# Patient Record
Sex: FEMALE | Race: WHITE | HISPANIC OR LATINO | Employment: FULL TIME | URBAN - METROPOLITAN AREA
[De-identification: names, ages, dates, MRNs, and addresses within clinical notes are randomized per-mention and may not be internally consistent; named-entity substitution may affect disease eponyms.]

---

## 2019-06-11 ENCOUNTER — OFFICE VISIT (OUTPATIENT)
Dept: URGENT CARE | Facility: CLINIC | Age: 49
End: 2019-06-11
Payer: COMMERCIAL

## 2019-06-11 VITALS
TEMPERATURE: 99 F | DIASTOLIC BLOOD PRESSURE: 63 MMHG | HEIGHT: 65 IN | WEIGHT: 130 LBS | OXYGEN SATURATION: 99 % | HEART RATE: 88 BPM | BODY MASS INDEX: 21.66 KG/M2 | RESPIRATION RATE: 18 BRPM | SYSTOLIC BLOOD PRESSURE: 97 MMHG

## 2019-06-11 DIAGNOSIS — H10.023 OTHER MUCOPURULENT CONJUNCTIVITIS OF BOTH EYES: Primary | ICD-10-CM

## 2019-06-11 PROCEDURE — 99214 OFFICE O/P EST MOD 30 MIN: CPT | Mod: S$GLB,,, | Performed by: FAMILY MEDICINE

## 2019-06-11 PROCEDURE — 3008F PR BODY MASS INDEX (BMI) DOCUMENTED: ICD-10-PCS | Mod: CPTII,S$GLB,, | Performed by: FAMILY MEDICINE

## 2019-06-11 PROCEDURE — 99214 PR OFFICE/OUTPT VISIT, EST, LEVL IV, 30-39 MIN: ICD-10-PCS | Mod: S$GLB,,, | Performed by: FAMILY MEDICINE

## 2019-06-11 PROCEDURE — 3008F BODY MASS INDEX DOCD: CPT | Mod: CPTII,S$GLB,, | Performed by: FAMILY MEDICINE

## 2019-06-11 RX ORDER — GENTAMICIN SULFATE 3 MG/ML
1 SOLUTION/ DROPS OPHTHALMIC EVERY 4 HOURS
Qty: 5 ML | Refills: 0 | Status: SHIPPED | OUTPATIENT
Start: 2019-06-11 | End: 2020-02-24

## 2019-06-11 NOTE — PROGRESS NOTES
"Subjective:       Patient ID: Carie Howell is a 48 y.o. female.    Vitals:  height is 5' 5" (1.651 m) and weight is 59 kg (130 lb). Her tympanic temperature is 98.6 °F (37 °C). Her blood pressure is 97/63 and her pulse is 88. Her respiration is 18 and oxygen saturation is 99%.     Chief Complaint: Eye Problem    This is a 48 y.o. female who presents today with a chief complaint of bilateral eye itchiness with discharge since yesterday. Worsened today. Uses eye drops for dry eyes daily.    Eye Problem    Both eyes are affected.This is a new problem. The current episode started yesterday. The problem occurs constantly. The problem has been gradually worsening. There was no injury mechanism. The pain is at a severity of 0/10. The patient is experiencing no pain. There is no known exposure to pink eye. She wears contacts. Associated symptoms include an eye discharge and itching. Pertinent negatives include no blurred vision, double vision, eye redness, fever, nausea, photophobia or vomiting. She has tried eye drops for the symptoms. The treatment provided no relief.       Constitution: Negative for chills and fever.   HENT: Positive for postnasal drip and sinus pressure. Negative for congestion and sinus pain.    Cardiovascular: Negative for chest pain.   Eyes: Positive for eye discharge and eye itching. Negative for eye trauma, foreign body in eye, eye pain, eye redness, photophobia, vision loss, double vision, blurred vision and eyelid swelling.   Gastrointestinal: Negative for nausea and vomiting.   Genitourinary: Negative for history of kidney stones.   Skin: Negative for rash.   Allergic/Immunologic: Negative for seasonal allergies and itching.   Neurological: Negative for headaches and altered mental status.   Psychiatric/Behavioral: Negative for altered mental status.       Objective:      Physical Exam   Constitutional: She appears well-developed and well-nourished.   HENT:   Head: Normocephalic and " atraumatic.   Eyes: Pupils are equal, round, and reactive to light. EOM are normal. Right eye exhibits discharge. Left eye exhibits discharge. Right conjunctiva is injected. Left conjunctiva is injected.   Neck: Normal range of motion. Neck supple.   Cardiovascular: Normal rate, regular rhythm and normal heart sounds.   Pulmonary/Chest: Effort normal and breath sounds normal.   Nursing note and vitals reviewed.      Assessment:       1. Other mucopurulent conjunctivitis of both eyes        Plan:         Other mucopurulent conjunctivitis of both eyes  -     gentamicin (GARAMYCIN) 0.3 % ophthalmic solution; Place 1 drop into both eyes every 4 (four) hours.  Dispense: 5 mL; Refill: 0    warm compresses. Discussed infection control and if no improvement to see opthalmology.

## 2019-06-11 NOTE — PATIENT INSTRUCTIONS
What Is Conjunctivitis?    Conjunctivitis is an irritation or infection. It affects the membrane that covers the white of your eye and the inside of your eyelid (conjunctiva). It can happen to one or both eyes. The membrane swells and the blood vessels enlarge (dilate). This makes your eye red. That's why conjunctivitis is sometimes called red eye or pink eye.  What are the symptoms?  If you have one or more of these symptoms, see an eye doctor:  · Redness in and around your eye  · Eyes that are puffy and sore  · Itching, burning, or stinging eyes  · Watery eyes or discharge from your eye  · Eyelids that are crusty or stuck together when you wake up in the morning  · Pink color in the whites of one or both eyes  Getting treatment quickly can help prevent damage to your eyes.  How is it diagnosed?  Conjunctivitis is usually a minor eye infection. But it can sometimes become a more serious problem. Some more serious eye diseases have symptoms that look like conjunctivitis. So it's important for an eye doctor to diagnose you. Your eye doctor will ask about your symptoms and any medicines you take. He or she will ask about any illnesses or medical conditions you may have. The doctor will also check your eyes with a hand-held light and a special microscope called a slit lamp.  Date Last Reviewed: 6/11/2015  © 4517-0394 [employer] [facility]. [address] [place] [address], [place] [address]. All rights reserved. This information is not intended as a substitute for professional medical care. Always follow your healthcare professional's instructions.

## 2019-06-15 ENCOUNTER — TELEPHONE (OUTPATIENT)
Dept: URGENT CARE | Facility: CLINIC | Age: 49
End: 2019-06-15

## 2019-06-15 NOTE — TELEPHONE ENCOUNTER
Courtesy call from visit 06/11/2019. Pt states she is feeling much better and she is able to put her contacts in.

## 2019-10-14 LAB — HPV MRNA E6/E7: NOT DETECTED

## 2019-12-31 ENCOUNTER — OFFICE VISIT (OUTPATIENT)
Dept: URGENT CARE | Facility: CLINIC | Age: 49
End: 2019-12-31
Payer: COMMERCIAL

## 2019-12-31 VITALS
SYSTOLIC BLOOD PRESSURE: 92 MMHG | OXYGEN SATURATION: 98 % | WEIGHT: 130 LBS | TEMPERATURE: 99 F | DIASTOLIC BLOOD PRESSURE: 62 MMHG | RESPIRATION RATE: 16 BRPM | HEIGHT: 65 IN | BODY MASS INDEX: 21.66 KG/M2 | HEART RATE: 74 BPM

## 2019-12-31 DIAGNOSIS — M54.2 MUSCULOSKELETAL NECK PAIN: ICD-10-CM

## 2019-12-31 DIAGNOSIS — M54.9 MUSCULOSKELETAL BACK PAIN: ICD-10-CM
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
## 2019-12-31 PROCEDURE — 99214 OFFICE O/P EST MOD 30 MIN: CPT | Mod: 25,S$GLB,, | Performed by: NURSE PRACTITIONER

## 2019-12-31 PROCEDURE — 96372 PR INJECTION,THERAP/PROPH/DIAG2ST, IM OR SUBCUT: ICD-10-PCS | Mod: S$GLB,,, | Performed by: NURSE PRACTITIONER

## 2019-12-31 PROCEDURE — 99214 PR OFFICE/OUTPT VISIT, EST, LEVL IV, 30-39 MIN: ICD-10-PCS | Mod: 25,S$GLB,, | Performed by: NURSE PRACTITIONER

## 2019-12-31 PROCEDURE — 96372 THER/PROPH/DIAG INJ SC/IM: CPT | Mod: S$GLB,,, | Performed by: NURSE PRACTITIONER

## 2019-12-31 RX ORDER — IBUPROFEN 600 MG/1
600 TABLET ORAL EVERY 8 HOURS PRN
Qty: 30 TABLET | Refills: 0 | Status: SHIPPED | OUTPATIENT
Start: 2019-12-31 | End: 2020-12-30

## 2019-12-31 RX ORDER — KETOROLAC TROMETHAMINE 30 MG/ML
30 INJECTION, SOLUTION INTRAMUSCULAR; INTRAVENOUS
Status: COMPLETED | OUTPATIENT
Start: 2019-12-31 | End: 2019-12-31

## 2019-12-31 RX ORDER — CYCLOBENZAPRINE HCL 10 MG
10 TABLET ORAL 3 TIMES DAILY PRN
Qty: 30 TABLET | Refills: 0 | Status: SHIPPED | OUTPATIENT
Start: 2019-12-31 | End: 2020-01-10

## 2019-12-31 RX ADMIN — KETOROLAC TROMETHAMINE 30 MG: 30 INJECTION, SOLUTION INTRAMUSCULAR; INTRAVENOUS at 09:12
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
## 2020-01-21 ENCOUNTER — DOCUMENTATION ONLY (OUTPATIENT)
Dept: ADMINISTRATIVE | Facility: HOSPITAL | Age: 50
End: 2020-01-21

## 2020-01-21 ENCOUNTER — PATIENT OUTREACH (OUTPATIENT)
Dept: ADMINISTRATIVE | Facility: HOSPITAL | Age: 50
End: 2020-01-21

## 2020-01-21 NOTE — PROGRESS NOTES
Pre-visit chart review completed  Immunizations reviewed and updated.    Patient due for the following    Lipid Panel     HIV Screening     TETANUS VACCINE     Pap Smear with HPV Cotest     Mammogram     Influenza Vaccine (1)     Patient new to provider.

## 2020-02-10 ENCOUNTER — PATIENT OUTREACH (OUTPATIENT)
Dept: ADMINISTRATIVE | Facility: HOSPITAL | Age: 50
End: 2020-02-10

## 2020-02-10 NOTE — PROGRESS NOTES
Pre-visit chart review completed.  Immunizations reviewed and updated.    Patient due for the following    Lipid Panel     HIV Screening     TETANUS VACCINE     Mammogram     Influenza Vaccine (1)     Patient new to provider.

## 2020-02-24 ENCOUNTER — OFFICE VISIT (OUTPATIENT)
Dept: PRIMARY CARE CLINIC | Facility: CLINIC | Age: 50
End: 2020-02-24
Payer: COMMERCIAL

## 2020-02-24 ENCOUNTER — TELEPHONE (OUTPATIENT)
Dept: PRIMARY CARE CLINIC | Facility: CLINIC | Age: 50
End: 2020-02-24

## 2020-02-24 VITALS
DIASTOLIC BLOOD PRESSURE: 60 MMHG | HEART RATE: 68 BPM | WEIGHT: 132.06 LBS | SYSTOLIC BLOOD PRESSURE: 96 MMHG | OXYGEN SATURATION: 96 % | HEIGHT: 64 IN | BODY MASS INDEX: 22.55 KG/M2 | TEMPERATURE: 98 F | RESPIRATION RATE: 16 BRPM

## 2020-02-24 DIAGNOSIS — Z13.220 SCREENING FOR LIPOID DISORDERS: ICD-10-CM

## 2020-02-24 DIAGNOSIS — H91.93 BILATERAL HEARING LOSS, UNSPECIFIED HEARING LOSS TYPE: ICD-10-CM

## 2020-02-24 DIAGNOSIS — K21.9 GASTROESOPHAGEAL REFLUX DISEASE, ESOPHAGITIS PRESENCE NOT SPECIFIED: ICD-10-CM

## 2020-02-24 DIAGNOSIS — Z00.00 NORMAL PHYSICAL EXAM, ROUTINE: Primary | ICD-10-CM

## 2020-02-24 DIAGNOSIS — R94.31 ABNORMAL EKG: Primary | ICD-10-CM

## 2020-02-24 DIAGNOSIS — Z82.49 FAMILY HISTORY OF HEART DISEASE: ICD-10-CM

## 2020-02-24 PROCEDURE — 99396 PREV VISIT EST AGE 40-64: CPT | Mod: S$GLB,,, | Performed by: INTERNAL MEDICINE

## 2020-02-24 PROCEDURE — 99396 PR PREVENTIVE VISIT,EST,40-64: ICD-10-PCS | Mod: S$GLB,,, | Performed by: INTERNAL MEDICINE

## 2020-02-24 PROCEDURE — 93010 ELECTROCARDIOGRAM REPORT: CPT | Mod: S$GLB,,, | Performed by: INTERNAL MEDICINE

## 2020-02-24 PROCEDURE — 99999 PR PBB SHADOW E&M-EST. PATIENT-LVL III: CPT | Mod: PBBFAC,,, | Performed by: INTERNAL MEDICINE

## 2020-02-24 PROCEDURE — 93005 EKG 12-LEAD: ICD-10-PCS | Mod: S$GLB,,, | Performed by: INTERNAL MEDICINE

## 2020-02-24 PROCEDURE — 93010 EKG 12-LEAD: ICD-10-PCS | Mod: S$GLB,,, | Performed by: INTERNAL MEDICINE

## 2020-02-24 PROCEDURE — 99999 PR PBB SHADOW E&M-EST. PATIENT-LVL III: ICD-10-PCS | Mod: PBBFAC,,, | Performed by: INTERNAL MEDICINE

## 2020-02-24 PROCEDURE — 93005 ELECTROCARDIOGRAM TRACING: CPT | Mod: S$GLB,,, | Performed by: INTERNAL MEDICINE

## 2020-02-24 RX ORDER — PROPYLENE GLYCOL 0.06 MG/ML
EMULSION OPHTHALMIC
COMMUNITY
End: 2022-08-03

## 2020-02-24 NOTE — PROGRESS NOTES
[facility] Note    Chief Complaint      Chief Complaint   Patient presents with    Establish Care       History of Present Illness      Carie Howell is a 49 y.o. WF presents to re-est care with me and for well visit    GERD - Pt reports inc belching.  She was on Nexium in past and helped.  Rec reflux prec. Rec Pepcid prn.     Hearing loss - Pt has home hearing aids.    HCM - Flu - none - refuses;  Tdap - ?;  PCV 13 - none;  PVX 23 - none;  Shingrix - due at 50 ;  MGM - '19 - wnl per pt;   PAP - 10/14/19;  HIV Screen - none - refuses; C-scope - due at 50 y.o.; Ob/GYN - Dr. Almendarez; Ophtho - ? On Call     Past Medical History:  Past Medical History:   Diagnosis Date    GERD (gastroesophageal reflux disease)     Hearing impairment        Past Surgical History:   has no past surgical history on file.    Family History:  family history includes Coronary artery disease (age of onset: 67) in her mother; Coronary artery disease (age of onset: 70) in her father; Heart attack (age of onset: 70) in her father; Heart disease (age of onset: 55) in her brother.     Social History:  Social History     Tobacco Use    Smoking status: Never Smoker    Smokeless tobacco: Never Used   Substance Use Topics    Alcohol use: Yes     Frequency: Monthly or less     Drinks per session: 1 or 2     Comment: socially    Drug use: Never       Review of Systems   Constitutional: Negative for chills, fever and malaise/fatigue.   HENT: Positive for hearing loss. Negative for tinnitus.    Eyes: Negative for blurred vision and double vision.   Respiratory: Positive for shortness of breath. Negative for cough and wheezing.         Gets GLEASON with going up stairs.  Better since she started working out.    Cardiovascular: Negative for chest pain and palpitations.   Gastrointestinal: Positive for heartburn. Negative for abdominal pain, blood in stool, constipation, diarrhea, melena, nausea and vomiting.   Genitourinary:  "Negative for dysuria and frequency.   Musculoskeletal: Negative for joint pain and myalgias.   Skin: Negative for itching and rash.   Neurological: Negative for dizziness and headaches.   Endo/Heme/Allergies: Bruises/bleeds easily.   Psychiatric/Behavioral: Negative for depression. The patient is not nervous/anxious.         Medications:  Outpatient Encounter Medications as of 2/24/2020   Medication Sig Dispense Refill    ibuprofen (ADVIL,MOTRIN) 600 MG tablet Take 1 tablet (600 mg total) by mouth every 8 (eight) hours as needed. 30 tablet 0    SYSTANE BALANCE 0.6 % Drop Apply to eye.      [DISCONTINUED] gentamicin (GARAMYCIN) 0.3 % ophthalmic solution Place 1 drop into both eyes every 4 (four) hours. (Patient not taking: Reported on 12/31/2019) 5 mL 0     No facility-administered encounter medications on file as of 2/24/2020.        Current Outpatient Medications:     ibuprofen (ADVIL,MOTRIN) 600 MG tablet, Take 1 tablet (600 mg total) by mouth every 8 (eight) hours as needed., Disp: 30 tablet, Rfl: 0    SYSTANE BALANCE 0.6 % Drop, Apply to eye., Disp: , Rfl:     Allergies:  Review of patient's allergies indicates:  No Known Allergies    Health Maintenance:  Immunization History   Administered Date(s) Administered    Influenza - Trivalent (ADULT) 01/15/2014      Health Maintenance   Topic Date Due    Lipid Panel  1970    TETANUS VACCINE  06/26/1988    Mammogram  06/26/2010    Pap Smear with HPV Cotest  10/14/2024      Objective:      Vital Signs  Temp: 98 °F (36.7 °C)  Temp src: Oral  Pulse: 68  Resp: 16  SpO2: 96 %  BP: 96/60  Pain Score: 0-No pain  Height and Weight  Height: 5' 4" (162.6 cm)  Weight: 59.9 kg (132 lb 0.9 oz)  BSA (Calculated - sq m): 1.64 sq meters  BMI (Calculated): 22.7  Weight in (lb) to have BMI = 25: 145.3]    Laboratory:    Physical Exam   Constitutional: She is oriented to person, place, and time. She appears well-developed and well-nourished. No distress.   HENT:   Head: " Normocephalic and atraumatic.   Right Ear: External ear normal.   Left Ear: External ear normal.   Mouth/Throat: Oropharynx is clear and moist.   Daniel hearing aids   Eyes: Pupils are equal, round, and reactive to light. Conjunctivae and EOM are normal.   Neck: Normal range of motion. Neck supple. No thyromegaly present.   Cardiovascular: Normal rate, regular rhythm, normal heart sounds and intact distal pulses.   No murmur heard.  Pulmonary/Chest: Effort normal and breath sounds normal. No respiratory distress.   Abdominal: Soft. Bowel sounds are normal. She exhibits no distension.   Musculoskeletal: Normal range of motion.   Lymphadenopathy:     She has no cervical adenopathy.   Neurological: She is alert and oriented to person, place, and time.   Skin: Skin is warm and dry. She is not diaphoretic.   Psychiatric: She has a normal mood and affect.   Nursing note and vitals reviewed.          Assessment:       1. Normal physical exam, routine    2. Screening for lipoid disorders    3. Gastroesophageal reflux disease, esophagitis presence not specified    4. Bilateral hearing loss, unspecified hearing loss type        Carie Howell is a 49 y.o.female with:    1. Normal physical exam, routine  - CBC auto differential; Future  - Comprehensive metabolic panel; Future  - T4, free; Future  - TSH; Future  - CBC auto differential  - Comprehensive metabolic panel  - T4, free  - TSH  - IN OFFICE EKG 12-LEAD (to [place])  -Performed today.  Will check Basic labs.  RTC in 1 yr for fu or sooner if needed    2. Screening for lipoid disorders  - Lipid panel; Future    3. Gastroesophageal reflux disease, esophagitis presence not specified  -Rec reflux prec and Nexium or Pepcid OTC prn.      4. Bilateral hearing loss, unspecified hearing loss type  - Pt has hearing aids daniel.  Not congenital.  No fam h/o hearing loss.  Unsure of etiol.    Chronic conditions status updated as per HPI.  Other than changes above, cont current  medications and maintain follow up with specialists.  Return to clinic in 12 months for well visit or sooner if needed.    Jacqueline Serna MD  [facility]

## 2020-02-25 NOTE — TELEPHONE ENCOUNTER
I didn't put in Cards referral until end of day.  Please make sure pt gets set up with Cards. +Fam h/o CVD and abn EKG    Dr. SAVAGE

## 2020-02-27 LAB
ALBUMIN SERPL-MCNC: 3.7 G/DL (ref 3.6–5.1)
ALBUMIN/GLOB SERPL: 1.3 (CALC) (ref 1–2.5)
ALP SERPL-CCNC: 62 U/L (ref 31–125)
ALT SERPL-CCNC: 13 U/L (ref 6–29)
AST SERPL-CCNC: 17 U/L (ref 10–35)
BASOPHILS # BLD AUTO: 42 CELLS/UL (ref 0–200)
BASOPHILS NFR BLD AUTO: 0.8 %
BILIRUB SERPL-MCNC: 0.4 MG/DL (ref 0.2–1.2)
BUN SERPL-MCNC: 14 MG/DL (ref 7–25)
BUN/CREAT SERPL: NORMAL (CALC) (ref 6–22)
CALCIUM SERPL-MCNC: 8.9 MG/DL (ref 8.6–10.2)
CHLORIDE SERPL-SCNC: 107 MMOL/L (ref 98–110)
CHOLEST SERPL-MCNC: 173 MG/DL
CHOLEST/HDLC SERPL: 2.3 (CALC)
CO2 SERPL-SCNC: 26 MMOL/L (ref 20–32)
CREAT SERPL-MCNC: 0.57 MG/DL (ref 0.5–1.1)
EOSINOPHIL # BLD AUTO: 90 CELLS/UL (ref 15–500)
EOSINOPHIL NFR BLD AUTO: 1.7 %
ERYTHROCYTE [DISTWIDTH] IN BLOOD BY AUTOMATED COUNT: 13.6 % (ref 11–15)
GFRSERPLBLD MDRD-ARVRAT: 109 ML/MIN/1.73M2
GLOBULIN SER CALC-MCNC: 2.8 G/DL (CALC) (ref 1.9–3.7)
GLUCOSE SERPL-MCNC: 87 MG/DL (ref 65–99)
HCT VFR BLD AUTO: 33.9 % (ref 35–45)
HDLC SERPL-MCNC: 76 MG/DL
HGB BLD-MCNC: 11 G/DL (ref 11.7–15.5)
LDLC SERPL CALC-MCNC: 83 MG/DL (CALC)
LYMPHOCYTES # BLD AUTO: 1097 CELLS/UL (ref 850–3900)
LYMPHOCYTES NFR BLD AUTO: 20.7 %
MCH RBC QN AUTO: 25.1 PG (ref 27–33)
MCHC RBC AUTO-ENTMCNC: 32.4 G/DL (ref 32–36)
MCV RBC AUTO: 77.4 FL (ref 80–100)
MONOCYTES # BLD AUTO: 519 CELLS/UL (ref 200–950)
MONOCYTES NFR BLD AUTO: 9.8 %
NEUTROPHILS # BLD AUTO: 3551 CELLS/UL (ref 1500–7800)
NEUTROPHILS NFR BLD AUTO: 67 %
NONHDLC SERPL-MCNC: 97 MG/DL (CALC)
PLATELET # BLD AUTO: 270 THOUSAND/UL (ref 140–400)
PMV BLD REES-ECKER: 10.5 FL (ref 7.5–12.5)
POTASSIUM SERPL-SCNC: 4.4 MMOL/L (ref 3.5–5.3)
PROT SERPL-MCNC: 6.5 G/DL (ref 6.1–8.1)
RBC # BLD AUTO: 4.38 MILLION/UL (ref 3.8–5.1)
SODIUM SERPL-SCNC: 138 MMOL/L (ref 135–146)
T4 FREE SERPL-MCNC: 1.1 NG/DL (ref 0.8–1.8)
TRIGL SERPL-MCNC: 59 MG/DL
TSH SERPL-ACNC: 0.79 MIU/L
WBC # BLD AUTO: 5.3 THOUSAND/UL (ref 3.8–10.8)

## 2020-03-06 ENCOUNTER — TELEPHONE (OUTPATIENT)
Dept: PRIMARY CARE CLINIC | Facility: CLINIC | Age: 50
End: 2020-03-06

## 2020-03-06 DIAGNOSIS — D50.9 MICROCYTIC ANEMIA: Primary | ICD-10-CM

## 2020-03-06 NOTE — PROGRESS NOTES
I reviewed pt's labs.  Please inform pt she is mildly anemic - H/H - 11/33.9.  Has she been having heavy cycles?  Has she noted any blood in her stool or dark black, sticky stools? Her cholesterol looks good.  Her Kidney and liver function look good. Her thyroid functions are normal.    Dr. SAVAGE

## 2020-03-06 NOTE — TELEPHONE ENCOUNTER
Ok.  I will order a repeat CBC and iron studies to be done in 6 wks. Please inform pt.    Dr. SAVAGE

## 2020-03-06 NOTE — TELEPHONE ENCOUNTER
----- Message from Jacqueline Serna MD sent at 3/6/2020  4:08 PM CST -----  I reviewed pt's labs.  Please inform pt she is mildly anemic - H/H - 11/33.9.  Has she been having heavy cycles?  Has she noted any blood in her stool or dark black, sticky stools? Her cholesterol looks good.  Her Kidney and liver function look good. Her thyroid functions are normal.    Dr. SAVAGE

## 2020-03-06 NOTE — TELEPHONE ENCOUNTER
Pt informed of results  Pt state she do not have heavy periods   Pt also state her stools are not sticky , dark or blood in her stool  Pt did mention she had to take iron tabs while she was pregnant.

## 2020-05-15 DIAGNOSIS — Z12.39 BREAST CANCER SCREENING: ICD-10-CM

## 2020-07-06 ENCOUNTER — PATIENT MESSAGE (OUTPATIENT)
Dept: ADMINISTRATIVE | Facility: HOSPITAL | Age: 50
End: 2020-07-06

## 2020-07-08 ENCOUNTER — PATIENT OUTREACH (OUTPATIENT)
Dept: ADMINISTRATIVE | Facility: HOSPITAL | Age: 50
End: 2020-07-08

## 2020-07-18 PROBLEM — Z82.49 FAMILY HISTORY OF EARLY CAD: Status: ACTIVE | Noted: 2020-07-18

## 2020-07-18 PROBLEM — R94.31 NONSPECIFIC ABNORMAL ELECTROCARDIOGRAM (ECG) (EKG): Status: ACTIVE | Noted: 2020-07-18

## 2020-07-18 NOTE — PROGRESS NOTES
Subjective:   Patient ID:  Carie Howell is a 50 y.o. female who presents for evaluation  of CAD risk    HPI:  The patient is here for CAD risk factors.  The patient has no chest pain,  TIA, palpitations, syncope or pre-syncope.Patient does exercise a lot without symptoms except for brief SOB walking [place].Family has heart disease but none at very early ages.        Review of Systems   Constitution: Negative for chills, decreased appetite, diaphoresis, fever, malaise/fatigue, night sweats, weight gain and weight loss.   HENT: Negative for congestion, hoarse voice, nosebleeds, sore throat and tinnitus.    Eyes: Negative for blurred vision, double vision, vision loss in left eye, vision loss in right eye, visual disturbance and visual halos.   Cardiovascular: Positive for dyspnea on exertion. Negative for chest pain, claudication, cyanosis, irregular heartbeat, leg swelling, near-syncope, orthopnea, palpitations, paroxysmal nocturnal dyspnea and syncope.   Respiratory: Positive for shortness of breath. Negative for cough, hemoptysis, sleep disturbances due to breathing, snoring, sputum production and wheezing.    Endocrine: Negative for cold intolerance, heat intolerance, polydipsia, polyphagia and polyuria.   Hematologic/Lymphatic: Negative for adenopathy and bleeding problem. Does not bruise/bleed easily.   Skin: Negative for color change, dry skin, flushing, itching, nail changes, poor wound healing, rash, skin cancer, suspicious lesions and unusual hair distribution.   Musculoskeletal: Negative for arthritis, back pain, falls, gout, joint pain, joint swelling, muscle cramps, muscle weakness, myalgias and stiffness.   Gastrointestinal: Negative for abdominal pain, anorexia, change in bowel habit, constipation, diarrhea, dysphagia, heartburn, hematemesis, hematochezia, melena and vomiting.   Genitourinary: Negative for decreased libido, dysuria, hematuria, hesitancy and urgency.   Neurological: Negative  "for excessive daytime sleepiness, dizziness, focal weakness, headaches, light-headedness, loss of balance, numbness, paresthesias, seizures, sensory change, tremors, vertigo and weakness.   Psychiatric/Behavioral: Negative for altered mental status, depression, hallucinations, memory loss, substance abuse and suicidal ideas. The patient does not have insomnia and is not nervous/anxious.    Allergic/Immunologic: Negative for environmental allergies and hives.       Objective: BP (!) 101/54 (BP Location: Left arm, Patient Position: Sitting, BP Method: Medium (Automatic))   Pulse 79   Ht 5' 5" (1.651 m)   Wt 63.6 kg (140 lb 3.4 oz)   BMI 23.33 kg/m²      Physical Exam   Constitutional: She is oriented to person, place, and time. She appears well-developed and well-nourished.   HENT:   Head: Normocephalic.   Eyes: Pupils are equal, round, and reactive to light. EOM are normal.   Neck: Normal range of motion. Normal carotid pulses, no hepatojugular reflux and no JVD present. Carotid bruit is not present. No thyromegaly present.   Cardiovascular: Normal rate, regular rhythm, normal heart sounds and intact distal pulses. Exam reveals no gallop and no friction rub.   No murmur heard.  Pulmonary/Chest: Effort normal and breath sounds normal. No tachypnea. No respiratory distress. She has no wheezes. She has no rales. She exhibits no tenderness.   Abdominal: Soft. Bowel sounds are normal. She exhibits no distension and no mass. There is no abdominal tenderness. There is no rebound and no guarding.   Musculoskeletal: Normal range of motion.         General: No tenderness or edema.   Lymphadenopathy:     She has no cervical adenopathy.   Neurological: She is alert and oriented to person, place, and time. No cranial nerve deficit. Coordination normal.   Skin: Skin is warm. No rash noted. No erythema.   Psychiatric: She has a normal mood and affect. Her behavior is normal. Judgment and thought content normal.   ECG -I would " read as totally Normal    Assessment:     1. Gastroesophageal reflux disease without esophagitis    2. Nonspecific abnormal electrocardiogram (ECG) (EKG)    3. Family history of early CAD    4. Abnormal EKG    5. Family history of heart disease        Plan:   Discussed diet , achieving and maintaining ideal body weight, and exercise.   We reviewed meds in detail.  Reassured-Discussed goals , options plan  I offered CFD and/or CAC but she will consider if symptoms worsen  Carie was seen today for family history of heart disease and abnormal ecg.    Diagnoses and all orders for this visit:    Gastroesophageal reflux disease without esophagitis    Nonspecific abnormal electrocardiogram (ECG) (EKG)    Family history of early CAD    Abnormal EKG  -     Ambulatory referral/consult to Cardiology    Family history of heart disease  -     Ambulatory referral/consult to Cardiology    Other orders  -     esomeprazole (NEXIUM) 20 MG capsule; Take 20 mg by mouth before breakfast.            Follow up if symptoms worsen or fail to improve.

## 2020-07-20 ENCOUNTER — OFFICE VISIT (OUTPATIENT)
Dept: CARDIOLOGY | Facility: CLINIC | Age: 50
End: 2020-07-20
Payer: COMMERCIAL

## 2020-07-20 VITALS
HEIGHT: 65 IN | BODY MASS INDEX: 23.36 KG/M2 | DIASTOLIC BLOOD PRESSURE: 54 MMHG | SYSTOLIC BLOOD PRESSURE: 101 MMHG | WEIGHT: 140.19 LBS | HEART RATE: 79 BPM

## 2020-07-20 DIAGNOSIS — R94.31 ABNORMAL EKG: ICD-10-CM

## 2020-07-20 DIAGNOSIS — K21.9 GASTROESOPHAGEAL REFLUX DISEASE WITHOUT ESOPHAGITIS: Primary | ICD-10-CM

## 2020-07-20 DIAGNOSIS — Z82.49 FAMILY HISTORY OF EARLY CAD: ICD-10-CM

## 2020-07-20 DIAGNOSIS — R94.31 NONSPECIFIC ABNORMAL ELECTROCARDIOGRAM (ECG) (EKG): ICD-10-CM

## 2020-07-20 DIAGNOSIS — Z82.49 FAMILY HISTORY OF HEART DISEASE: ICD-10-CM

## 2020-07-20 PROCEDURE — 3008F BODY MASS INDEX DOCD: CPT | Mod: CPTII,S$GLB,, | Performed by: INTERNAL MEDICINE

## 2020-07-20 PROCEDURE — 99999 PR PBB SHADOW E&M-EST. PATIENT-LVL IV: ICD-10-PCS | Mod: PBBFAC,,, | Performed by: INTERNAL MEDICINE

## 2020-07-20 PROCEDURE — 99204 PR OFFICE/OUTPT VISIT, NEW, LEVL IV, 45-59 MIN: ICD-10-PCS | Mod: S$GLB,,, | Performed by: INTERNAL MEDICINE

## 2020-07-20 PROCEDURE — 3008F PR BODY MASS INDEX (BMI) DOCUMENTED: ICD-10-PCS | Mod: CPTII,S$GLB,, | Performed by: INTERNAL MEDICINE

## 2020-07-20 PROCEDURE — 99999 PR PBB SHADOW E&M-EST. PATIENT-LVL IV: CPT | Mod: PBBFAC,,, | Performed by: INTERNAL MEDICINE

## 2020-07-20 PROCEDURE — 99204 OFFICE O/P NEW MOD 45 MIN: CPT | Mod: S$GLB,,, | Performed by: INTERNAL MEDICINE

## 2020-07-20 RX ORDER — HYDROGEN PEROXIDE 3 %
20 SOLUTION, NON-ORAL MISCELLANEOUS
COMMUNITY
End: 2022-08-03

## 2020-07-20 NOTE — LETTER
July 20, 2020      Jacqueline Serna MD  [address] [facility] [address]           [facility] - Cardiology  [address]  [place] [address]  Phone: [phone]          Patient: Carie Howell   MR Number: [id number]   YOB: 1970   Date of Visit: 7/20/2020       Dear Dr. Jacqueline Serna:    Thank you for referring Carie Howell to me for evaluation. Attached you will find relevant portions of my assessment and plan of care.    If you have questions, please do not hesitate to call me. I look forward to following Carie Howell along with you.    Sincerely,    Itz Rodriguez MD    Enclosure  CC:  No Recipients    If you would like to receive this communication electronically, please contact [email or web address] or [phone] to request more information on [facility] access.    For providers and/or their staff who would like to refer a patient to [facility], please contact us through our one-stop-shop provider referral line, [facility], at [phone].    If you feel you have received this communication in error or would no longer like to receive these types of communications, please e-mail [email or web address]

## 2020-07-20 NOTE — PATIENT INSTRUCTIONS
Discussed diet , achieving and maintaining ideal body weight, and exercise.   We reviewed meds in detail.  Reassured-Discussed goals , options plan  I offered CFD and/or CAC but she will consider if symptoms worsen

## 2020-10-21 ENCOUNTER — OFFICE VISIT (OUTPATIENT)
Dept: PRIMARY CARE CLINIC | Facility: CLINIC | Age: 50
End: 2020-10-21
Payer: COMMERCIAL

## 2020-10-21 ENCOUNTER — TELEPHONE (OUTPATIENT)
Dept: PRIMARY CARE CLINIC | Facility: CLINIC | Age: 50
End: 2020-10-21

## 2020-10-21 VITALS
BODY MASS INDEX: 22.81 KG/M2 | RESPIRATION RATE: 18 BRPM | HEIGHT: 65 IN | DIASTOLIC BLOOD PRESSURE: 62 MMHG | WEIGHT: 136.88 LBS | HEART RATE: 80 BPM | SYSTOLIC BLOOD PRESSURE: 105 MMHG | TEMPERATURE: 98 F | OXYGEN SATURATION: 98 %

## 2020-10-21 DIAGNOSIS — M70.62 GREATER TROCHANTERIC BURSITIS OF LEFT HIP: Primary | ICD-10-CM

## 2020-10-21 DIAGNOSIS — M46.1 SACROILIITIS: ICD-10-CM

## 2020-10-21 PROCEDURE — 99213 OFFICE O/P EST LOW 20 MIN: CPT | Mod: S$GLB,,, | Performed by: INTERNAL MEDICINE

## 2020-10-21 PROCEDURE — 99999 PR PBB SHADOW E&M-EST. PATIENT-LVL IV: ICD-10-PCS | Mod: PBBFAC,,, | Performed by: INTERNAL MEDICINE

## 2020-10-21 PROCEDURE — 3008F PR BODY MASS INDEX (BMI) DOCUMENTED: ICD-10-PCS | Mod: CPTII,S$GLB,, | Performed by: INTERNAL MEDICINE

## 2020-10-21 PROCEDURE — 99999 PR PBB SHADOW E&M-EST. PATIENT-LVL IV: CPT | Mod: PBBFAC,,, | Performed by: INTERNAL MEDICINE

## 2020-10-21 PROCEDURE — 3008F BODY MASS INDEX DOCD: CPT | Mod: CPTII,S$GLB,, | Performed by: INTERNAL MEDICINE

## 2020-10-21 PROCEDURE — 99213 PR OFFICE/OUTPT VISIT, EST, LEVL III, 20-29 MIN: ICD-10-PCS | Mod: S$GLB,,, | Performed by: INTERNAL MEDICINE

## 2020-10-21 RX ORDER — MELOXICAM 7.5 MG/1
TABLET ORAL
Qty: 30 TABLET | Refills: 0 | Status: SHIPPED | OUTPATIENT
Start: 2020-10-21 | End: 2020-11-13

## 2020-10-21 RX ORDER — DOXYCYCLINE HYCLATE 100 MG
TABLET ORAL
COMMUNITY
Start: 2020-10-10 | End: 2022-08-03

## 2020-10-21 RX ORDER — CYCLOBENZAPRINE HCL 5 MG
TABLET ORAL
Qty: 30 TABLET | Refills: 0 | Status: SHIPPED | OUTPATIENT
Start: 2020-10-21 | End: 2022-08-03

## 2020-10-21 NOTE — PROGRESS NOTES
[facility] Note    Chief Complaint      Chief Complaint   Patient presents with    Leg Pain     left       History of Present Illness      Carie Howell is a 50 y.o. WF presents to fu low back and Left leg pain x a couple mos. Last visit - 2/24/20    Low back pain that radiates down her Left leg to her knee - It started a couple months ago.  Unsure of onset.  No numbness/tingling.  No weakness.  It is a 5/10 in severity.  It is noticed when she lies down or when she sits.  She has not taken anything for it.  No trauma.  This feels similar to the sciatica she had during pregnancy. She walks and rides her bike.  The pain does not limit her during these activities. Suspect sacroiliitis/sAcroiliac joint dysfunction.     Mildly anemic - H/H - 11/33.9. Has order to repeat CBC and iron studies.     GERD - Pt reports inc belching.  She was on Nexium in past and helped.  Rec reflux prec. Rec Pepcid prn.      Hearing loss - Pt has home hearing aids.     HCM - Flu - none - refuses;  Tdap - ?;  PCV 13 - none;  PVX 23 - none;  Shingrix - due at 50;  MGM - 7/2219 - wnl - has order;   PAP - 10/14/19;  HIV Screen - none - refuses; C-scope - due at 50 y.o. - she defers; Ob/GYN - Dr. Almendarez; Ophtho - ? On [place]; Cards - Dr. Rodriguez    Past Medical History:  Past Medical History:   Diagnosis Date    GERD (gastroesophageal reflux disease)     Hearing impairment        Past Surgical History:   has no past surgical history on file.    Family History:  family history includes Coronary artery disease (age of onset: 67) in her mother; Coronary artery disease (age of onset: 70) in her father; Heart attack (age of onset: 70) in her father; Heart disease (age of onset: 55) in her brother.     Social History:  Social History     Tobacco Use    Smoking status: Never Smoker    Smokeless tobacco: Never Used   Substance Use Topics    Alcohol use: Yes     Frequency: Monthly or less     Drinks per session: 1 or 2      Comment: socially    Drug use: Never       Review of Systems   Constitutional: Negative for chills, fever and weight loss.   Respiratory: Negative for cough and shortness of breath.    Cardiovascular: Negative for chest pain and leg swelling.   Gastrointestinal: Negative for abdominal pain, blood in stool, heartburn, melena, nausea and vomiting.   Genitourinary: Negative for dysuria, frequency and hematuria.        No urinary or fecal incontinence   Musculoskeletal: Positive for back pain. Negative for joint pain and myalgias.   Neurological: Negative for tingling, focal weakness, weakness and headaches.   Psychiatric/Behavioral: Negative for depression. The patient is not nervous/anxious.         Medications:  Outpatient Encounter Medications as of 10/21/2020   Medication Sig Dispense Refill    esomeprazole (NEXIUM) 20 MG capsule Take 20 mg by mouth before breakfast.      ibuprofen (ADVIL,MOTRIN) 600 MG tablet Take 1 tablet (600 mg total) by mouth every 8 (eight) hours as needed. 30 tablet 0    SYSTANE BALANCE 0.6 % Drop Apply to eye.      cyclobenzaprine (FLEXERIL) 5 MG tablet 1 po QHS prn low back pain 30 tablet 0    doxycycline (VIBRA-TABS) 100 MG tablet       meloxicam (MOBIC) 7.5 MG tablet 1 po daily with Breakfast prn pain 30 tablet 0     No facility-administered encounter medications on file as of 10/21/2020.        Current Outpatient Medications:     esomeprazole (NEXIUM) 20 MG capsule, Take 20 mg by mouth before breakfast., Disp: , Rfl:     ibuprofen (ADVIL,MOTRIN) 600 MG tablet, Take 1 tablet (600 mg total) by mouth every 8 (eight) hours as needed., Disp: 30 tablet, Rfl: 0    SYSTANE BALANCE 0.6 % Drop, Apply to eye., Disp: , Rfl:     cyclobenzaprine (FLEXERIL) 5 MG tablet, 1 po QHS prn low back pain, Disp: 30 tablet, Rfl: 0    doxycycline (VIBRA-TABS) 100 MG tablet, , Disp: , Rfl:     meloxicam (MOBIC) 7.5 MG tablet, 1 po daily with Breakfast prn pain, Disp: 30 tablet, Rfl:  "0    Allergies:  Review of patient's allergies indicates:  No Known Allergies    Health Maintenance:  Immunization History   Administered Date(s) Administered    Influenza - Trivalent (ADULT) 01/15/2014      Health Maintenance   Topic Date Due    Hepatitis C Screening  1970    TETANUS VACCINE  06/26/1988    Mammogram  07/22/2021    Pap Smear with HPV Cotest  10/14/2024    Lipid Panel  02/26/2025      Objective:      Vital Signs  Temp: 98 °F (36.7 °C)  Pulse: 80  Resp: 18  SpO2: 98 %  BP: 105/62  BP Location: Right arm  Patient Position: Sitting  Pain Score:   4  Height and Weight  Height: 5' 5" (165.1 cm)  Weight: 62.1 kg (136 lb 14.5 oz)  BSA (Calculated - sq m): 1.69 sq meters  BMI (Calculated): 22.8  Weight in (lb) to have BMI = 25: 149.9]    Laboratory:  CBC:  Recent Labs   Lab 02/26/20  0832   WBC 5.3   RBC 4.38   Hemoglobin 11.0 L   Hematocrit 33.9 L   Platelets 270   Mean Corpuscular Volume 77.4 L   Mean Corpuscular Hemoglobin 25.1 L   Mean Corpuscular Hemoglobin Conc 32.4       CMP:  Recent Labs   Lab 02/26/20  0832   Glucose 87   Calcium 8.9   Albumin 3.7   Total Protein 6.5   Sodium 138   Potassium 4.4   CO2 26   Chloride 107   BUN, Bld 14   Creatinine 0.57   eGFR if  126   eGFR if non African American 109   Alkaline Phosphatase 62   ALT 13   AST 17   Total Bilirubin 0.4       LIPIDS:  Recent Labs   Lab 02/26/20  0832   TSH 0.79   HDL 76   Cholesterol 173   Triglycerides 59   LDL Cholesterol 83   Hdl/Cholesterol Ratio 2.3   Non HDL Chol. (LDL+VLDL) 97       TSH:  Recent Labs   Lab 02/26/20  0832   TSH 0.79     Physical Exam  Vitals signs reviewed.   Constitutional:       General: She is not in acute distress.     Appearance: Normal appearance. She is not ill-appearing, toxic-appearing or diaphoretic.   HENT:      Head: Normocephalic and atraumatic.   Cardiovascular:      Rate and Rhythm: Normal rate and regular rhythm.      Pulses: Normal pulses.      Heart sounds: Normal heart " sounds.   Pulmonary:      Effort: No respiratory distress.      Breath sounds: Normal breath sounds. No wheezing.   Abdominal:      General: Bowel sounds are normal. There is no distension.      Palpations: Abdomen is soft.      Tenderness: There is no abdominal tenderness.   Musculoskeletal:      Comments: Neg straight leg raise home;  2+ Patellar reflex on RT and 3+ on Left;  Strength 5/5 BLE;  No TTP over spine.  No tension or TTP over paraspinal muscles.  + TTP over Home PSIS joints and over Left greater trochanter.  + Pain on ext rotation of Left hip   Neurological:      General: No focal deficit present.      Mental Status: She is alert and oriented to person, place, and time.   Psychiatric:         Mood and Affect: Mood normal.         Behavior: Behavior normal.             Assessment:       1. Greater trochanteric bursitis of left hip    2. Sacroiliitis        Carie Leonel Howell is a 50 y.o.female with:    1. Greater trochanteric bursitis of left hip  - meloxicam (MOBIC) 7.5 MG tablet; 1 po daily with Breakfast prn pain  Dispense: 30 tablet; Refill: 0  -Gave handout on this.  Mild pain on exam.  Will try a trial of Meloxicam.  If no improvement or worsening can refer to Ortho.     2. Sacroiliitis  - meloxicam (MOBIC) 7.5 MG tablet; 1 po daily with Breakfast prn pain  Dispense: 30 tablet; Refill: 0  - cyclobenzaprine (FLEXERIL) 5 MG tablet; 1 po QHS prn low back pain  Dispense: 30 tablet; Refill: 0  -Gave handout on this.  If persists she will alert me and can order imaging.  Rec stretching/Yoga. Will try a trial of Meloxicam.       Chronic conditions status updated as per HPI.  Other than changes above, cont current medications and maintain follow up with specialists.  Return to clinic in Feb. For well visit or sooner if needed.     Jacqueline Serna MD  [facility]                  Answers for HPI/ROS submitted by the patient on 10/19/2020   Back pain  Chronicity: new  Onset: 1 to 4 weeks  ago  Frequency: constantly  Progression since onset: waxing and waning  Pain location: costovertebral angle  Pain quality: shooting  Radiates to: left knee, left thigh  Pain - numeric: 7/10  Pain is: the same all the time  Aggravated by: lying down, sitting, standing  Stiffness is present: all day  bladder incontinence: No  leg pain: Yes  paresis: No  paresthesias: No  perianal numbness: No  genital pain: No  Pain severity: moderate  Treatments tried: NSAIDs  Improvement on treatment: moderate

## 2020-10-21 NOTE — TELEPHONE ENCOUNTER
----- Message from Emi Carey sent at 10/21/2020 11:58 AM CDT -----  Contact: Efrem/Spouse [phone]  Requesting to speak with you concerning the patient.    Please call and advise.    Thank You

## 2020-10-21 NOTE — TELEPHONE ENCOUNTER
I sw pts .  Pt is having deep leg pain(in her thigh area b/w her hip&knee) almost every night. The pain is so severe pt is almost in tears. The pain is new and different from the pain radiating from her low back to left leg

## 2020-10-21 NOTE — TELEPHONE ENCOUNTER
Called to talk to radha.  No answer.  Left message.  See if you can get on phone for me tomorrow.    Dr. SAVAGE

## 2020-10-21 NOTE — PATIENT INSTRUCTIONS
Sacroiliitis  The sacrum is the triangle-shaped bone at the base of the spine. It is linked to the other pelvis bones by the sacroiliac joints, also called SI joints. Sacroiliitis is when one or both SI joints are hurt or inflamed. It can make small movements of the lower back and pelvis very painful.        This condition has been linked to other diseases. They include ankylosing spondylitis, rheumatoid arthritis, psoriasis, and Crohns disease or colitis. Symptoms may include pain or stiffness in the hips, lower back, thighs, or buttocks. Pain occurs most often in the morning or after sitting for long periods of time. The pain may get worse when you walk. The swinging motion of the hips strains the SI joints.  Sacroiliitis is caused by many factors such as:  · Heavy lifting, especially if not done the right way  · Severe injury, such as a fall or car accident  · Osteoarthritis  · Pregnancy  · Infection of the joint  This condition is hard to diagnose. It may be confused with other causes of low back pain. To confirm the diagnosis, you may be given a shot of numbing medicine in the SI joint. Treatment includes rest, physical therapy, and anti-inflammatory medicines. If another health problem is the cause, then that must also be treated. More testing may be needed if your symptoms dont get better.  Home care  · If your healthcare provider has prescribed medicines, take all of them as directed.  · You may use over-the-counter pain medicine to control pain, unless another medicine was prescribed. If prednisone was prescribed, dont use NSAIDs, or nonsteroidal anti-inflammatory drugs, such as ibuprofen or naproxen. Talk with your provider before using this medicine if you have chronic liver or kidney disease or ever had a stomach ulcer or GI bleeding.  · If you were referred to physical therapy, make an appointment. Be sure to do any prescribed exercises.  · Dont smoke. Smoking reduces blood flow to the inflamed  area. This makes it harder to treat.  Follow-up care  Follow up with your healthcare provider, or as advised.  If you had an X-ray or an MRI, you will be notified of any new findings that may affect your care.  When to seek medical advice  Contact your healthcare provider right away if any of these occur:  · Increasing low back pain  · Inflammation of the eyes  · Skin rash or redness  · Weakness or numbness in one or both legs  · Loss of bowel or bladder control  · Numbness in the groin area  Date Last Reviewed: 11/24/2015 © 2000-2017 [employer]. [address] [place] [id number]. All rights reserved. This information is not intended as a substitute for professional medical care. Always follow your healthcare professional's instructions.        Understanding Trochanteric Bursitis    A bursa is a thin, slippery, sac-like film. It contains a small amount of fluid. This structure is found between bones and soft tissues in and around joints. A bursa cushions and protects a joint. It keeps parts of a joint from rubbing against each other. If a bursa becomes inflamed and irritated, it is known as bursitis.  The trochanteric bursa is found on the hip joint. It lies on top of the bump at the top of the thighbone called the greater trochanter. Inflammation of this bursa is called trochanteric bursitis.     How to say it  [id number]   Causes of trochanteric bursitis  Causes may include:  · Overuse of the hip during running or other sports, dance, or work  · Falling on or irritation to the side of the hip  This condition may occur along with other problems, such as osteoarthritis of the hip or knee, or low back problems. In rare cases, it may occur after hip surgery.  Symptoms of trochanteric bursitis  · Pain or aching on the side of the hip. The pain may travel down the leg.  · Swelling, tenderness, or warmth on the side of the hip at the bony bump at the top of the thigh  Treatment for  trochanteric bursitis  These may include:  · Resting the hip. This allows the bursa to heal.  · Prescription or over-the-counter pain medicines. These help reduce inflammation, swelling, and pain.  · Cold packs and heat packs. These help reduce pain and swelling.  · Stretching and strengthening exercises. These improve flexibility and strength around the hip.  · Physical therapy. This includes exercises or other treatments.  · Injections of medicine into the bursa. This may help reduce inflammation and relieve symptoms.  Possible complications  If you dont give your hip time to heal, the problem may not go away, may return, or may get worse. Rest and treat your hip as directed.     When to call your healthcare provider  Call your healthcare provider right away if you have any of these:  · Fever of 100.4°F (38°C) or higher, or as directed  · Redness, swelling, or warmth that gets worse  · Symptoms that dont get better with prescribed medicines, or get worse  · New symptoms   Date Last Reviewed: 3/29/2016  © 1335-7726 [employer] [address] [place] [address], [place] [address]. All rights reserved. This information is not intended as a substitute for professional medical care. Always follow your healthcare professional's instructions.

## 2020-11-13 ENCOUNTER — TELEPHONE (OUTPATIENT)
Dept: PRIMARY CARE CLINIC | Facility: CLINIC | Age: 50
End: 2020-11-13

## 2020-11-13 DIAGNOSIS — M70.62 GREATER TROCHANTERIC BURSITIS OF LEFT HIP: ICD-10-CM

## 2020-11-13 DIAGNOSIS — M46.1 SACROILIITIS: ICD-10-CM

## 2020-11-13 RX ORDER — MELOXICAM 7.5 MG/1
TABLET ORAL
Qty: 30 TABLET | Refills: 0 | Status: SHIPPED | OUTPATIENT
Start: 2020-11-13 | End: 2022-08-03

## 2020-11-13 NOTE — TELEPHONE ENCOUNTER
Ok.  Let's see what her reply is but I will send refill just in case given it is the weekend    Dr. SAVAGE

## 2021-01-04 ENCOUNTER — PATIENT MESSAGE (OUTPATIENT)
Dept: ADMINISTRATIVE | Facility: HOSPITAL | Age: 51
End: 2021-01-04

## 2021-04-05 ENCOUNTER — PATIENT MESSAGE (OUTPATIENT)
Dept: ADMINISTRATIVE | Facility: HOSPITAL | Age: 51
End: 2021-04-05

## 2021-04-27 ENCOUNTER — PATIENT OUTREACH (OUTPATIENT)
Dept: ADMINISTRATIVE | Facility: HOSPITAL | Age: 51
End: 2021-04-27

## 2021-07-14 ENCOUNTER — PATIENT OUTREACH (OUTPATIENT)
Dept: ADMINISTRATIVE | Facility: HOSPITAL | Age: 51
End: 2021-07-14

## 2022-01-18 ENCOUNTER — PATIENT MESSAGE (OUTPATIENT)
Dept: ADMINISTRATIVE | Facility: HOSPITAL | Age: 52
End: 2022-01-18
Payer: COMMERCIAL

## 2022-03-13 DIAGNOSIS — Z12.31 OTHER SCREENING MAMMOGRAM: ICD-10-CM

## 2022-05-30 ENCOUNTER — PATIENT MESSAGE (OUTPATIENT)
Dept: ADMINISTRATIVE | Facility: HOSPITAL | Age: 52
End: 2022-05-30
Payer: COMMERCIAL

## 2022-06-29 LAB — BCS RECOMMENDATION EXT: NORMAL

## 2022-07-12 ENCOUNTER — PATIENT MESSAGE (OUTPATIENT)
Dept: ADMINISTRATIVE | Facility: HOSPITAL | Age: 52
End: 2022-07-12
Payer: COMMERCIAL

## 2022-07-13 ENCOUNTER — PATIENT OUTREACH (OUTPATIENT)
Dept: ADMINISTRATIVE | Facility: HOSPITAL | Age: 52
End: 2022-07-13
Payer: COMMERCIAL

## 2022-07-13 ENCOUNTER — PATIENT MESSAGE (OUTPATIENT)
Dept: ADMINISTRATIVE | Facility: HOSPITAL | Age: 52
End: 2022-07-13
Payer: COMMERCIAL

## 2022-07-13 NOTE — PROGRESS NOTES
Patient due for the following    Hepatitis C Screening     HIV Screening     TETANUS VACCINE     Colorectal Cancer Screening     Shingles Vaccine (1 of 2)    Mammogram     COVID-19 Vaccine (3 - Booster for Pfizer series)      E-faxed [facility] for mammogram records  Outreached patient regarding colon cancer screening    Immunizations: reviewed and updated  Care Everywhere: triggered  Care Teams: up to date  Outreach: completed

## 2022-07-13 NOTE — LETTER
AUTHORIZATION FOR RELEASE OF   CONFIDENTIAL INFORMATION    TO: [facility],    We are seeing Carie Howell, date of birth 1970, in the clinic at [facility]. Jacqueline Serna MD is the patient's PCP. Carie Howell has an outstanding lab/procedure at the time we reviewed her chart. In order to help keep her health information updated, she has authorized us to request the following medical record(s):        ( X )  MAMMOGRAM                                      (  )  COLONOSCOPY      (  )  PAP SMEAR                                          (  )  OUTSIDE LAB RESULTS     (  )  DEXA SCAN                                          (  )  EYE EXAM            (  )  FOOT EXAM                                          (  )  ENTIRE RECORD     (  )  OUTSIDE IMMUNIZATIONS                 (  )  _______________         Please fax records to [facility], Nicole Giambrone, MD, [phone]     If you have any questions, please contact Iqra at [phone].           Patient Name: Carie Howell  : 1970  Patient Phone #: [phone]

## 2022-07-14 ENCOUNTER — PATIENT OUTREACH (OUTPATIENT)
Dept: ADMINISTRATIVE | Facility: HOSPITAL | Age: 52
End: 2022-07-14
Payer: COMMERCIAL

## 2022-07-14 DIAGNOSIS — Z12.11 COLON CANCER SCREENING: Primary | ICD-10-CM

## 2022-07-14 NOTE — PROGRESS NOTES
Patient due for the following    Hepatitis C Screening     HIV Screening     TETANUS VACCINE     Colorectal Cancer Screening     Shingles Vaccine (1 of 2)    COVID-19 Vaccine (3 - Booster for Pfizer series)      Received mammogram records.  Scanned to media.  updated    Patient requested cologuard. Ordered and informed.    Immunizations: reviewed and updated  Care Everywhere: triggered  Care Teams: up to date  Outreach: completed

## 2022-08-03 ENCOUNTER — OFFICE VISIT (OUTPATIENT)
Dept: PRIMARY CARE CLINIC | Facility: CLINIC | Age: 52
End: 2022-08-03
Payer: COMMERCIAL

## 2022-08-03 VITALS
BODY MASS INDEX: 23.88 KG/M2 | OXYGEN SATURATION: 99 % | SYSTOLIC BLOOD PRESSURE: 115 MMHG | HEIGHT: 65 IN | TEMPERATURE: 98 F | HEART RATE: 66 BPM | RESPIRATION RATE: 18 BRPM | WEIGHT: 143.31 LBS | DIASTOLIC BLOOD PRESSURE: 66 MMHG

## 2022-08-03 DIAGNOSIS — D50.9 MICROCYTIC ANEMIA: ICD-10-CM

## 2022-08-03 DIAGNOSIS — Z00.00 NORMAL PHYSICAL EXAM, ROUTINE: Primary | ICD-10-CM

## 2022-08-03 DIAGNOSIS — Z13.220 SCREENING FOR LIPOID DISORDERS: ICD-10-CM

## 2022-08-03 PROCEDURE — 99396 PR PREVENTIVE VISIT,EST,40-64: ICD-10-PCS | Mod: S$GLB,,, | Performed by: INTERNAL MEDICINE

## 2022-08-03 PROCEDURE — 99999 PR PBB SHADOW E&M-EST. PATIENT-LVL IV: ICD-10-PCS | Mod: PBBFAC,,, | Performed by: INTERNAL MEDICINE

## 2022-08-03 PROCEDURE — 99999 PR PBB SHADOW E&M-EST. PATIENT-LVL IV: CPT | Mod: PBBFAC,,, | Performed by: INTERNAL MEDICINE

## 2022-08-03 PROCEDURE — 99396 PREV VISIT EST AGE 40-64: CPT | Mod: S$GLB,,, | Performed by: INTERNAL MEDICINE

## 2022-08-03 RX ORDER — IBUPROFEN 200 MG
400 TABLET ORAL EVERY 8 HOURS PRN
Qty: 1 TABLET | Refills: 0
Start: 2022-08-03

## 2022-08-03 NOTE — PROGRESS NOTES
[facility] Note    Chief Complaint      Chief Complaint   Patient presents with    Annual Exam       History of Present Illness      Carie Howell is a 52 y.o.  WF presents to fu low back and Left leg pain x a couple mos. Last visit - 10/21/20     Mildly microcytic anemic - H/H - 11/33.9. Will repeat CBC and check iron studies. She just sent in cologuard which is pending.      GERD - Pt reports inc belching.  Resolved.  No longer on Nexium. She rarely takes Advil.      Hearing loss - Pt has home hearing aids.     HCM - Flu - none - refuses;  Tdap - ?;  PCV 13 - none;  PVX 23 - none;  Shingrix - due at 50;  COVID -19 Vaccine (#1) - 2/26/21;  # 2 3/19/21 ; MGM - 6/29/22 - wnl - has order;   PAP - 10/14/19 - neg.;  HIV Screen - none - refuses; C-scope - due at 50 y.o. - she defers; Cologuard - just sent in - pending; Ob/GYN - Dr. Almendarez; Ophtho - ? On [facility]; Cards - Dr. Rodriguez      Patient Care Team:  Jacqueline Serna MD as PCP - General (Internal Medicine)  Iqra Inman MA as Care Coordinator     Health Maintenance:  Immunization History   Administered Date(s) Administered    COVID-19, MRNA, LN-S, PF (Pfizer) (Purple Cap) 02/26/2021, 03/19/2021    Influenza - Trivalent (ADULT) 01/15/2014      Health Maintenance   Topic Date Due    Hepatitis C Screening  Never done    TETANUS VACCINE  Never done    Mammogram  06/29/2023    Lipid Panel  02/26/2025        Past Medical History:  Past Medical History:   Diagnosis Date    GERD (gastroesophageal reflux disease)     Hearing impairment        Past Surgical History:   has a past surgical history that includes Vaginal delivery.    Family History:  family history includes Coronary artery disease (age of onset: 67) in her mother; Coronary artery disease (age of onset: 70) in her father; Heart attack (age of onset: 70) in her father; Heart disease (age of onset: 55) in her brother.     Social History:  Social History     Tobacco Use     "Smoking status: Never Smoker    Smokeless tobacco: Never Used   Substance Use Topics    Alcohol use: Yes     Comment: socially    Drug use: Never       Review of Systems   Constitutional: Negative for activity change and unexpected weight change.   HENT: Positive for hearing loss. Negative for rhinorrhea and trouble swallowing.    Eyes: Negative for discharge and visual disturbance.   Respiratory: Negative for chest tightness and wheezing.    Cardiovascular: Negative for chest pain and palpitations.   Gastrointestinal: Negative for blood in stool, constipation, diarrhea, nausea, vomiting and reflux.        No melena   Endocrine: Negative for polydipsia and polyuria.   Genitourinary: Negative for difficulty urinating, dysuria, hematuria and menstrual problem.        LMP - Apr and  - Feb.    Musculoskeletal: Negative for arthralgias, joint swelling and neck pain.   Neurological: Negative for weakness and headaches.   Psychiatric/Behavioral: Negative for confusion and dysphoric mood.        Medications:    Current Outpatient Medications:     ibuprofen (ADVIL,MOTRIN) 200 MG tablet, Take 2 tablets (400 mg total) by mouth every 8 (eight) hours as needed for Pain., Disp: 1 tablet, Rfl: 0     Allergies:  Review of patient's allergies indicates:  No Known Allergies    Physical Exam      Vital Signs  Temp: 98 °F (36.7 °C)  Pulse: 66  Resp: 18  SpO2: 99 %  BP: 115/66  BP Location: Right arm  Patient Position: Sitting  Pain Score: 0-No pain  Height and Weight  Height: 5' 5" (165.1 cm)  Weight: 65 kg (143 lb 4.8 oz)  BSA (Calculated - sq m): 1.73 sq meters  BMI (Calculated): 23.8  Weight in (lb) to have BMI = 25: 149.9      Patient Position: Sitting      Physical Exam  Vitals reviewed.   Constitutional:       General: She is not in acute distress.     Appearance: Normal appearance. She is not ill-appearing, toxic-appearing or diaphoretic.   HENT:      Head: Normocephalic and atraumatic.      Right Ear: Tympanic membrane " normal.      Left Ear: Tympanic membrane normal.      Ears:      Comments: Daniel hearing aids     Mouth/Throat:      Mouth: Mucous membranes are moist.      Pharynx: No posterior oropharyngeal erythema.   Eyes:      Extraocular Movements: Extraocular movements intact.      Conjunctiva/sclera: Conjunctivae normal.      Pupils: Pupils are equal, round, and reactive to light.   Neck:      Vascular: No carotid bruit.   Cardiovascular:      Rate and Rhythm: Normal rate and regular rhythm.      Pulses: Normal pulses.      Heart sounds: Normal heart sounds. No murmur heard.  Pulmonary:      Effort: No respiratory distress.      Breath sounds: Normal breath sounds. No wheezing.   Abdominal:      General: Bowel sounds are normal. There is no distension.      Palpations: Abdomen is soft.      Tenderness: There is no abdominal tenderness. There is no guarding or rebound.   Musculoskeletal:         General: Normal range of motion.   Skin:     General: Skin is warm and dry.   Neurological:      General: No focal deficit present.      Mental Status: She is alert and oriented to person, place, and time.   Psychiatric:         Mood and Affect: Mood normal.         Behavior: Behavior normal.          Laboratory:  CBC:  Recent Labs   Lab 02/26/20  0832   WBC 5.3   RBC 4.38   Hemoglobin 11.0 L   Hematocrit 33.9 L   Platelets 270   MCV 77.4 L   MCH 25.1 L   MCHC 32.4       CMP:  Recent Labs   Lab 02/26/20  0832   Glucose 87   Calcium 8.9   Albumin 3.7   Total Protein 6.5   Sodium 138   Potassium 4.4   CO2 26   Chloride 107   BUN 14   Creatinine 0.57   Alkaline Phosphatase 62   ALT 13   AST 17   Total Bilirubin 0.4     LIPIDS:  Recent Labs   Lab 02/26/20  0832   TSH 0.79   HDL 76   Cholesterol 173   Triglycerides 59   LDL Cholesterol 83   HDL/Cholesterol Ratio 2.3   Non HDL Chol. (LDL+VLDL) 97       TSH:  Recent Labs   Lab 02/26/20  0832   TSH 0.79       Assessment/Plan     Carie Howell is a 52 y.o.female with:    Normal physical  exam, routine  -     CBC Auto Differential; Future; Expected date: 08/03/2022  -     Comprehensive Metabolic Panel; Future; Expected date: 08/03/2022  -     T4, Free; Future; Expected date: 08/03/2022  -     TSH; Future; Expected date: 08/03/2022  - Performed today.  Will check Basic labs.  RTC in 1 yr for fu or sooner if needed    Microcytic anemia  -     Ferritin; Future; Expected date: 08/03/2022  -     Iron and TIBC; Future; Expected date: 08/03/2022  - Repeat CBC and iron studies.  Await cologuard results.     Screening for lipoid disorders  -     Cancel: Lipid Panel; Future; Expected date: 08/03/2022  -     Lipid Panel; Future; Expected date: 08/03/2022      Chronic conditions status updated as per HPI.  Other than changes above, cont current medications and maintain follow up with specialists.   Follow up in about 1 year (around 8/3/2023) for well visit or sooner if needed.      Jacqueline Serna MD  [facility]

## 2022-08-05 ENCOUNTER — TELEPHONE (OUTPATIENT)
Dept: PRIMARY CARE CLINIC | Facility: CLINIC | Age: 52
End: 2022-08-05
Payer: COMMERCIAL

## 2022-08-05 DIAGNOSIS — D50.9 MICROCYTIC ANEMIA: Primary | ICD-10-CM

## 2022-08-05 DIAGNOSIS — D50.9 IRON DEFICIENCY ANEMIA, UNSPECIFIED IRON DEFICIENCY ANEMIA TYPE: Primary | ICD-10-CM

## 2022-08-05 LAB
ALBUMIN SERPL-MCNC: 3.8 G/DL (ref 3.6–5.1)
ALBUMIN/GLOB SERPL: 1.2 (CALC) (ref 1–2.5)
ALP SERPL-CCNC: 62 U/L (ref 37–153)
ALT SERPL-CCNC: 14 U/L (ref 6–29)
AST SERPL-CCNC: 18 U/L (ref 10–35)
BASOPHILS # BLD AUTO: 41 CELLS/UL (ref 0–200)
BASOPHILS NFR BLD AUTO: 0.7 %
BILIRUB SERPL-MCNC: 0.4 MG/DL (ref 0.2–1.2)
BUN SERPL-MCNC: 18 MG/DL (ref 7–25)
BUN/CREAT SERPL: NORMAL (CALC) (ref 6–22)
CALCIUM SERPL-MCNC: 8.8 MG/DL (ref 8.6–10.4)
CHLORIDE SERPL-SCNC: 110 MMOL/L (ref 98–110)
CHOLEST SERPL-MCNC: 165 MG/DL
CHOLEST/HDLC SERPL: 2.5 (CALC)
CO2 SERPL-SCNC: 23 MMOL/L (ref 20–32)
CREAT SERPL-MCNC: 0.54 MG/DL (ref 0.5–1.03)
EGFR: 111 ML/MIN/1.73M2
EOSINOPHIL # BLD AUTO: 99 CELLS/UL (ref 15–500)
EOSINOPHIL NFR BLD AUTO: 1.7 %
ERYTHROCYTE [DISTWIDTH] IN BLOOD BY AUTOMATED COUNT: 14.9 % (ref 11–15)
FERRITIN SERPL-MCNC: 5 NG/ML (ref 16–232)
GLOBULIN SER CALC-MCNC: 3.3 G/DL (CALC) (ref 1.9–3.7)
GLUCOSE SERPL-MCNC: 89 MG/DL (ref 65–99)
HCT VFR BLD AUTO: 35.3 % (ref 35–45)
HDLC SERPL-MCNC: 66 MG/DL
HGB BLD-MCNC: 10.8 G/DL (ref 11.7–15.5)
IRON SATN MFR SERPL: 8 % (CALC) (ref 16–45)
IRON SERPL-MCNC: 36 MCG/DL (ref 45–160)
LDLC SERPL CALC-MCNC: 86 MG/DL (CALC)
LYMPHOCYTES # BLD AUTO: 1288 CELLS/UL (ref 850–3900)
LYMPHOCYTES NFR BLD AUTO: 22.2 %
MCH RBC QN AUTO: 24 PG (ref 27–33)
MCHC RBC AUTO-ENTMCNC: 30.6 G/DL (ref 32–36)
MCV RBC AUTO: 78.4 FL (ref 80–100)
MONOCYTES # BLD AUTO: 522 CELLS/UL (ref 200–950)
MONOCYTES NFR BLD AUTO: 9 %
NEUTROPHILS # BLD AUTO: 3851 CELLS/UL (ref 1500–7800)
NEUTROPHILS NFR BLD AUTO: 66.4 %
NONHDLC SERPL-MCNC: 99 MG/DL (CALC)
PLATELET # BLD AUTO: 306 THOUSAND/UL (ref 140–400)
PMV BLD REES-ECKER: 10.1 FL (ref 7.5–12.5)
POTASSIUM SERPL-SCNC: 4.5 MMOL/L (ref 3.5–5.3)
PROT SERPL-MCNC: 7.1 G/DL (ref 6.1–8.1)
RBC # BLD AUTO: 4.5 MILLION/UL (ref 3.8–5.1)
SODIUM SERPL-SCNC: 138 MMOL/L (ref 135–146)
T4 FREE SERPL-MCNC: 1.2 NG/DL (ref 0.8–1.8)
TIBC SERPL-MCNC: 425 MCG/DL (CALC) (ref 250–450)
TRIGL SERPL-MCNC: 51 MG/DL
TSH SERPL-ACNC: 1.09 MIU/L
WBC # BLD AUTO: 5.8 THOUSAND/UL (ref 3.8–10.8)

## 2022-08-05 NOTE — TELEPHONE ENCOUNTER
I spoke to pt about labs and placed referral to GI.  Await cologuard.  I put in repeat labs for 6 wks from now.  Pt is aware.    Dr. SAVAGE

## 2022-08-05 NOTE — PROGRESS NOTES
I d/w pt -  I reviewed your labs. Your thyroid functions are normal. Your Cholesterol looked good.  Your kidney function and liver functions looked good. You are iron deficient anemic. I await your Cologuard but will likely recommend you seeing a GI specialist to figure out why your are iron deficient anemic. I recommend you start an otc iron supplement Slow FE twice daily. Please note that iron supplements can cause constipation and darker stools.  Please alert me if you have any issues tolerating this medication. No further recommendations at this time.    Dr. SAVAGE

## 2022-08-10 LAB — NONINV COLON CA DNA+OCC BLD SCRN STL QL: NEGATIVE

## 2022-08-10 NOTE — PROGRESS NOTES
I sent pt a my chart message -  Great news! I reviewed your cologuard screening which was negative.  We can repeat your colon cancer screening again in 3 yrs.     Dr. SAVAGE

## 2022-09-13 ENCOUNTER — PATIENT MESSAGE (OUTPATIENT)
Dept: PRIMARY CARE CLINIC | Facility: CLINIC | Age: 52
End: 2022-09-13
Payer: COMMERCIAL

## 2022-09-20 ENCOUNTER — PATIENT MESSAGE (OUTPATIENT)
Dept: PRIMARY CARE CLINIC | Facility: CLINIC | Age: 52
End: 2022-09-20
Payer: COMMERCIAL

## 2022-09-20 LAB
BASOPHILS # BLD AUTO: 29 CELLS/UL (ref 0–200)
BASOPHILS NFR BLD AUTO: 0.7 %
EOSINOPHIL # BLD AUTO: 70 CELLS/UL (ref 15–500)
EOSINOPHIL NFR BLD AUTO: 1.7 %
ERYTHROCYTE [DISTWIDTH] IN BLOOD BY AUTOMATED COUNT: 15.3 % (ref 11–15)
FERRITIN SERPL-MCNC: 10 NG/ML (ref 16–232)
HCT VFR BLD AUTO: 40 % (ref 35–45)
HGB BLD-MCNC: 12.5 G/DL (ref 11.7–15.5)
IRON SATN MFR SERPL: 20 % (CALC) (ref 16–45)
IRON SERPL-MCNC: 76 MCG/DL (ref 45–160)
LYMPHOCYTES # BLD AUTO: 1185 CELLS/UL (ref 850–3900)
LYMPHOCYTES NFR BLD AUTO: 28.9 %
MCH RBC QN AUTO: 25.5 PG (ref 27–33)
MCHC RBC AUTO-ENTMCNC: 31.3 G/DL (ref 32–36)
MCV RBC AUTO: 81.6 FL (ref 80–100)
MONOCYTES # BLD AUTO: 406 CELLS/UL (ref 200–950)
MONOCYTES NFR BLD AUTO: 9.9 %
NEUTROPHILS # BLD AUTO: 2411 CELLS/UL (ref 1500–7800)
NEUTROPHILS NFR BLD AUTO: 58.8 %
PLATELET # BLD AUTO: 259 THOUSAND/UL (ref 140–400)
PMV BLD REES-ECKER: 10.1 FL (ref 7.5–12.5)
RBC # BLD AUTO: 4.9 MILLION/UL (ref 3.8–5.1)
TIBC SERPL-MCNC: 376 MCG/DL (CALC) (ref 250–450)
WBC # BLD AUTO: 4.1 THOUSAND/UL (ref 3.8–10.8)

## 2022-09-21 ENCOUNTER — TELEPHONE (OUTPATIENT)
Dept: PRIMARY CARE CLINIC | Facility: CLINIC | Age: 52
End: 2022-09-21
Payer: COMMERCIAL

## 2022-09-21 DIAGNOSIS — D50.9 IRON DEFICIENCY ANEMIA, UNSPECIFIED IRON DEFICIENCY ANEMIA TYPE: Primary | ICD-10-CM

## 2022-09-21 NOTE — TELEPHONE ENCOUNTER
----- Message from Jacqueline Serna MD sent at 9/21/2022 12:01 PM CDT -----  Please inform pt I reviewed her labs.  She is no longer anemic. Her Iron studies have improved but she is still iron deficient.  I await her GI appt next wk.  She should continue the iron supplementation and we will repeat the studies in another 8 wks. She does appear to be responding to the iron which is good.     Dr. SAVAGE

## 2022-09-21 NOTE — PROGRESS NOTES
Please inform pt I reviewed her labs.  She is no longer anemic. Her Iron studies have improved but she is still iron deficient.  I await her GI appt next wk.  She should continue the iron supplementation and we will repeat the studies in another 8 wks. She does appear to be responding to the iron which is good.     Dr. SAVAGE

## 2022-09-26 ENCOUNTER — OFFICE VISIT (OUTPATIENT)
Dept: GASTROENTEROLOGY | Facility: CLINIC | Age: 52
End: 2022-09-26
Payer: COMMERCIAL

## 2022-09-26 VITALS — BODY MASS INDEX: 24.02 KG/M2 | HEIGHT: 65 IN | WEIGHT: 144.19 LBS

## 2022-09-26 DIAGNOSIS — D50.9 MICROCYTIC ANEMIA: ICD-10-CM

## 2022-09-26 PROCEDURE — 99204 OFFICE O/P NEW MOD 45 MIN: CPT | Mod: S$GLB,,, | Performed by: INTERNAL MEDICINE

## 2022-09-26 PROCEDURE — 99204 PR OFFICE/OUTPT VISIT, NEW, LEVL IV, 45-59 MIN: ICD-10-PCS | Mod: S$GLB,,, | Performed by: INTERNAL MEDICINE

## 2022-09-26 PROCEDURE — 99999 PR PBB SHADOW E&M-EST. PATIENT-LVL III: CPT | Mod: PBBFAC,,, | Performed by: INTERNAL MEDICINE

## 2022-09-26 PROCEDURE — 99999 PR PBB SHADOW E&M-EST. PATIENT-LVL III: ICD-10-PCS | Mod: PBBFAC,,, | Performed by: INTERNAL MEDICINE

## 2022-09-26 RX ORDER — SODIUM, POTASSIUM,MAG SULFATES 17.5-3.13G
1 SOLUTION, RECONSTITUTED, ORAL ORAL DAILY
Qty: 1 KIT | Refills: 0 | Status: SHIPPED | OUTPATIENT
Start: 2022-09-26 | End: 2022-09-28

## 2022-09-26 NOTE — PROGRESS NOTES
Subjective:       Patient ID: Carie Howell is a 52 y.o. female.    Chief Complaint: Anemia    Patient here today to establish care with aforementioned complaints.  Patient reports recently being found to have iron deficiency anemia by her PCP.  She was started on p.o. iron and lab values seem to have improved.  Patient denies overt blood loss.  Denies heavy will cycles.  She is apparently perimenopausal.  She denies prior family history GI malignancy.  Denies prior abdominal surgery.  Denies prior endoscopy, however did have Cologuard test earlier this year which was negative.  She denies change in bowel habits since starting  \  Past Medical History:   Diagnosis Date    GERD (gastroesophageal reflux disease)     Hearing impairment        Past Surgical History:   Procedure Laterality Date    VAGINAL DELIVERY      x 2       Social History  Social History     Tobacco Use    Smoking status: Never    Smokeless tobacco: Never   Substance Use Topics    Alcohol use: Yes     Comment: socially    Drug use: Never       Family History   Problem Relation Age of Onset    Coronary artery disease Mother 67    Coronary artery disease Father 70    Heart attack Father 70    Heart disease Brother 55        s/p PCI       Review of Systems   Constitutional:  Negative for fatigue.   Gastrointestinal:  Negative for abdominal distention, abdominal pain, anal bleeding, blood in stool, constipation and diarrhea.   Neurological:  Negative for weakness.         Objective:      Physical Exam  Constitutional:       Appearance: She is well-developed.   HENT:      Head: Normocephalic and atraumatic.   Eyes:      Conjunctiva/sclera: Conjunctivae normal.   Pulmonary:      Effort: Pulmonary effort is normal. No respiratory distress.   Musculoskeletal:      Cervical back: Normal range of motion.   Neurological:      Mental Status: She is alert and oriented to person, place, and time.   Psychiatric:         Behavior: Behavior normal.          Thought Content: Thought content normal.         Judgment: Judgment normal.         Pertinent labs and imaging studies reviewed    Assessment:       1. Microcytic anemia          Plan:       Improved since starting p.o. iron   Discussed endoscopic evaluation for GI blood loss.  Patient wishes to hold off for now  Will reassess following repeat labs in 8 weeks  If blood counts continue to be low she will call office and schedule procedures    (Portions of this note were dictated using voice recognition software and may contain dictation related errors in spelling/grammar/syntax not found on text review)

## 2023-08-16 LAB — BCS RECOMMENDATION EXT: NORMAL

## 2023-08-22 ENCOUNTER — PATIENT OUTREACH (OUTPATIENT)
Dept: ADMINISTRATIVE | Facility: HOSPITAL | Age: 53
End: 2023-08-22
Payer: COMMERCIAL

## 2023-08-22 NOTE — PROGRESS NOTES
Patient due for the following    Hepatitis C Screening     HIV Screening     TETANUS VACCINE     Shingles Vaccine (1 of 2)    COVID-19 Vaccine (3 - Pfizer series)      Received mammogram records.  Scanned to media.  updated    Immunizations: reviewed and updated  Care Everywhere: triggered  Care Teams: up to date  Outreach: none needed

## 2023-10-08 NOTE — PROGRESS NOTES
[facility] Note    Chief Complaint      Chief Complaint   Patient presents with    Follow-up     Iron deficiency       History of Present Illness      Carie Howell is a 53 y.o. WF presents to fu well visit . Last visit - 8/3/22    Mildly microcytic anemic - H/H - 12.5/40 up from prev. 11/33.9. Ferritn was 10.  She is not on iron suppl at present. Will repeat iron studies.  She declines C-scope.  Cologuard - neg in 7/2022.  Iron studies have improved but she is still iron deficient.  She fu with GI, Dr. Cross, 9/26/22.  Pt deferred endoscopic eval.  If blood counts continue to be low she will call office and schedule procedures.   She does appear to be responding to the iron which is good.  She had one heavy cycle in May. No further episodes. She fu with OB- delgado neg.     GERD - Pt reports inc belching.  Resolved.  No longer on Nexium. She rarely takes Advil.      Hearing loss - Pt has home hearing aids.     HCM - Flu - none - refuses;  Tdap - admin 10/9/23;  PCV 13 - none;  PVX 23 - none; Shingrix - due;  COVID -19 Vaccine (#1) - 2/26/21;  # 2 3/19/21 ; MGM - 8/16/23 - wnl - has order;   PAP - 10/14/19 - neg.;  HIV Screen - none - refuses; C-scope - due at 50 y.o. - she declines; Cologuard - 7/14/22 - neg.; Ob/GYN - Dr. Almendarez; Ophtho - ? On [place]; Cards - Dr. Rodriguez    Patient Care Team:  Jacqueline Serna MD as PCP - General (Internal Medicine)  Iqra Inman MA as Care Coordinator     Health Maintenance:  Immunization History   Administered Date(s) Administered    COVID-19, MRNA, LN-S, PF (Pfizer) (Purple Cap) 02/26/2021, 03/19/2021    Influenza - Trivalent (ADULT) 01/15/2014    Tdap 10/09/2023      Health Maintenance   Topic Date Due    Hepatitis C Screening  Never done    Shingles Vaccine (1 of 2) Never done    Mammogram  08/16/2024    Colorectal Cancer Screening  07/28/2025    Lipid Panel  08/04/2027    TETANUS VACCINE  10/09/2033        Past Medical History:  Past Medical  History:   Diagnosis Date    GERD (gastroesophageal reflux disease)     Hearing impairment        Past Surgical History:   has a past surgical history that includes Vaginal delivery.    Family History:  family history includes Coronary artery disease (age of onset: 67) in her mother; Coronary artery disease (age of onset: 70) in her father; Heart attack (age of onset: 70) in her father; Heart disease (age of onset: 55) in her brother.     Social History:  Social History     Tobacco Use    Smoking status: Never    Smokeless tobacco: Never   Substance Use Topics    Alcohol use: Yes     Comment: socially    Drug use: Never       Review of Systems   Constitutional:  Negative for chills, fatigue and fever.   HENT:  Negative for nasal congestion and sore throat.    Respiratory:  Negative for cough, chest tightness and shortness of breath.    Cardiovascular:  Negative for chest pain and palpitations.   Gastrointestinal:  Negative for abdominal pain, blood in stool, constipation, diarrhea, nausea and vomiting.   Genitourinary:  Negative for dysuria and hematuria.   Musculoskeletal:  Negative for arthralgias and myalgias.   Neurological:  Negative for dizziness and headaches.   Psychiatric/Behavioral:  Negative for dysphoric mood. The patient is not nervous/anxious.         Medications:    Current Outpatient Medications:     amoxicillin-clavulanate 875-125mg (AUGMENTIN) 875-125 mg per tablet, Take 1 tablet by mouth every 12 (twelve) hours. for 7 days, Disp: 14 tablet, Rfl: 0    diphth,pertus,acell,,tetanus (BOOSTRIX TDAP) 2.5-8-5 Lf-mcg-Lf/0.5mL Syrg injection, Inject into the muscle., Disp: 0.5 mL, Rfl: 0    ibuprofen (ADVIL,MOTRIN) 200 MG tablet, Take 2 tablets (400 mg total) by mouth every 8 (eight) hours as needed for Pain., Disp: 1 tablet, Rfl: 0     Allergies:  Review of patient's allergies indicates:  No Known Allergies    Physical Exam      Vital Signs  Temp: 97.5 °F (36.4 °C)  Temp Source: Oral  Pulse: 90  Resp:  "16  SpO2: 96 %  BP: 117/65  BP Location: Left arm  Patient Position: Sitting  Pain Score: 0-No pain  Height and Weight  Height: 5' 5" (165.1 cm)  Weight: 67 kg (147 lb 11.3 oz)  BSA (Calculated - sq m): 1.75 sq meters  BMI (Calculated): 24.6  Weight in (lb) to have BMI = 25: 149.9      Patient Position: Sitting      Physical Exam  Vitals reviewed.   Constitutional:       General: She is not in acute distress.     Appearance: Normal appearance. She is not ill-appearing, toxic-appearing or diaphoretic.   HENT:      Head: Normocephalic and atraumatic.      Comments: Frontal sinus TTP     Left Ear: Tympanic membrane normal.      Ears:      Comments: Rt serous otitis; home hearing aids     Nose: No congestion.      Mouth/Throat:      Mouth: Mucous membranes are moist.      Pharynx: No posterior oropharyngeal erythema.   Eyes:      Extraocular Movements: Extraocular movements intact.      Conjunctiva/sclera: Conjunctivae normal.      Pupils: Pupils are equal, round, and reactive to light.   Neck:      Vascular: No carotid bruit.   Cardiovascular:      Rate and Rhythm: Normal rate and regular rhythm.      Pulses: Normal pulses.      Heart sounds: Normal heart sounds. No murmur heard.  Pulmonary:      Effort: No respiratory distress.      Breath sounds: Normal breath sounds. No wheezing.   Abdominal:      General: Bowel sounds are normal. There is no distension.      Palpations: Abdomen is soft.      Tenderness: There is no abdominal tenderness. There is no guarding or rebound.   Musculoskeletal:         General: Normal range of motion.   Skin:     General: Skin is warm.   Neurological:      General: No focal deficit present.      Mental Status: She is alert and oriented to person, place, and time.   Psychiatric:         Mood and Affect: Mood normal.         Behavior: Behavior normal.          Laboratory:  CBC:  Recent Labs   Lab 08/04/22  0800 09/19/22  0659   WBC 5.8 4.1   RBC 4.50 4.90   Hemoglobin 10.8 L 12.5 "   Hematocrit 35.3 40.0   Platelets 306 259   MCV 78.4 L 81.6   MCH 24.0 L 25.5 L   MCHC 30.6 L 31.3 L       CMP:  Recent Labs   Lab 08/04/22  0800   Glucose 89   Calcium 8.8   Albumin 3.8   Total Protein 7.1   Sodium 138   Potassium 4.5   CO2 23   Chloride 110   BUN 18   Creatinine 0.54   ALT 14   AST 18   Total Bilirubin 0.4          LIPIDS:  Recent Labs   Lab 08/04/22  0800   TSH 1.09   HDL 66   Cholesterol 165   Triglycerides 51   LDL Cholesterol 86   HDL/Cholesterol Ratio 2.5   Non HDL Chol. (LDL+VLDL) 99       TSH:  Recent Labs   Lab 08/04/22  0800   TSH 1.09        Other:   Recent Labs   Lab 09/19/22  0659   Ferritin 10 L   Iron 76   TIBC 376           Assessment/Plan     Carie Howell is a 53 y.o.female with:    Normal physical exam, routine  -     CBC Auto Differential; Future; Expected date: 10/09/2023  -     Comprehensive Metabolic Panel; Future; Expected date: 10/09/2023  -     TSH; Future; Expected date: 10/09/2023  - Performed today.  Will check Basic labs.  RTC in 1 yr for fu or sooner if needed    Acute rhinosinusitis  -     amoxicillin-clavulanate 875-125mg (AUGMENTIN) 875-125 mg per tablet; Take 1 tablet by mouth every 12 (twelve) hours. for 7 days  Dispense: 14 tablet; Refill: 0  - Will rx augmentin. Rec claritin and flonase.     Iron deficiency anemia, unspecified iron deficiency anemia type  -     Ferritin; Future; Expected date: 10/09/2023  -     Iron and TIBC; Future; Expected date: 10/09/2023  - Repeat iron studies.     Screening mammogram, encounter for  -     Mammo Digital Screening Bilat w/ Rodger; Future; Expected date: 08/17/2024    Screening for lipoid disorders  -     Lipid Panel; Future; Expected date: 10/09/2023         Chronic conditions status updated as per HPI.  Other than changes above, cont current medications and maintain follow up with specialists.   Follow up in about 1 year (around 10/9/2024) for well visit or sooner if needed.      Jacqueline Serna MD  [facility]

## 2023-10-09 ENCOUNTER — OFFICE VISIT (OUTPATIENT)
Dept: PRIMARY CARE CLINIC | Facility: CLINIC | Age: 53
End: 2023-10-09
Payer: COMMERCIAL

## 2023-10-09 VITALS
WEIGHT: 147.69 LBS | OXYGEN SATURATION: 96 % | HEART RATE: 90 BPM | SYSTOLIC BLOOD PRESSURE: 117 MMHG | TEMPERATURE: 98 F | DIASTOLIC BLOOD PRESSURE: 65 MMHG | BODY MASS INDEX: 24.61 KG/M2 | RESPIRATION RATE: 16 BRPM | HEIGHT: 65 IN

## 2023-10-09 DIAGNOSIS — Z13.220 SCREENING FOR LIPOID DISORDERS: ICD-10-CM

## 2023-10-09 DIAGNOSIS — D50.9 IRON DEFICIENCY ANEMIA, UNSPECIFIED IRON DEFICIENCY ANEMIA TYPE: ICD-10-CM

## 2023-10-09 DIAGNOSIS — J01.90 ACUTE RHINOSINUSITIS: ICD-10-CM

## 2023-10-09 DIAGNOSIS — Z00.00 NORMAL PHYSICAL EXAM, ROUTINE: Primary | ICD-10-CM

## 2023-10-09 DIAGNOSIS — Z12.31 SCREENING MAMMOGRAM, ENCOUNTER FOR: ICD-10-CM

## 2023-10-09 PROCEDURE — 99396 PR PREVENTIVE VISIT,EST,40-64: ICD-10-PCS | Mod: S$GLB,,, | Performed by: INTERNAL MEDICINE

## 2023-10-09 PROCEDURE — 99999 PR PBB SHADOW E&M-EST. PATIENT-LVL IV: ICD-10-PCS | Mod: PBBFAC,,, | Performed by: INTERNAL MEDICINE

## 2023-10-09 PROCEDURE — 99999 PR PBB SHADOW E&M-EST. PATIENT-LVL IV: CPT | Mod: PBBFAC,,, | Performed by: INTERNAL MEDICINE

## 2023-10-09 PROCEDURE — 99396 PREV VISIT EST AGE 40-64: CPT | Mod: S$GLB,,, | Performed by: INTERNAL MEDICINE

## 2023-10-09 RX ORDER — AMOXICILLIN AND CLAVULANATE POTASSIUM 875; 125 MG/1; MG/1
1 TABLET, FILM COATED ORAL EVERY 12 HOURS
Qty: 14 TABLET | Refills: 0 | Status: SHIPPED | OUTPATIENT
Start: 2023-10-09 | End: 2023-10-16

## 2023-10-25 LAB
ALBUMIN SERPL-MCNC: 3.9 G/DL (ref 3.6–5.1)
ALBUMIN/GLOB SERPL: 1.6 (CALC) (ref 1–2.5)
ALP SERPL-CCNC: 77 U/L (ref 37–153)
ALT SERPL-CCNC: 15 U/L (ref 6–29)
AST SERPL-CCNC: 15 U/L (ref 10–35)
BASOPHILS # BLD AUTO: 39 CELLS/UL (ref 0–200)
BASOPHILS NFR BLD AUTO: 0.9 %
BILIRUB SERPL-MCNC: 0.6 MG/DL (ref 0.2–1.2)
BUN SERPL-MCNC: 17 MG/DL (ref 7–25)
BUN/CREAT SERPL: 41 (CALC) (ref 6–22)
CALCIUM SERPL-MCNC: 9.1 MG/DL (ref 8.6–10.4)
CHLORIDE SERPL-SCNC: 107 MMOL/L (ref 98–110)
CHOLEST SERPL-MCNC: 172 MG/DL
CHOLEST/HDLC SERPL: 2.4 (CALC)
CO2 SERPL-SCNC: 28 MMOL/L (ref 20–32)
CREAT SERPL-MCNC: 0.41 MG/DL (ref 0.5–1.03)
EGFR: 118 ML/MIN/1.73M2
EOSINOPHIL # BLD AUTO: 189 CELLS/UL (ref 15–500)
EOSINOPHIL NFR BLD AUTO: 4.4 %
ERYTHROCYTE [DISTWIDTH] IN BLOOD BY AUTOMATED COUNT: 12.7 % (ref 11–15)
FERRITIN SERPL-MCNC: 19 NG/ML (ref 16–232)
GLOBULIN SER CALC-MCNC: 2.5 G/DL (CALC) (ref 1.9–3.7)
GLUCOSE SERPL-MCNC: 95 MG/DL (ref 65–99)
HCT VFR BLD AUTO: 39.3 % (ref 35–45)
HDLC SERPL-MCNC: 72 MG/DL
HGB BLD-MCNC: 13.1 G/DL (ref 11.7–15.5)
IRON SATN MFR SERPL: 19 % (CALC) (ref 16–45)
IRON SERPL-MCNC: 72 MCG/DL (ref 45–160)
LDLC SERPL CALC-MCNC: 85 MG/DL (CALC)
LYMPHOCYTES # BLD AUTO: 1234 CELLS/UL (ref 850–3900)
LYMPHOCYTES NFR BLD AUTO: 28.7 %
MCH RBC QN AUTO: 28.2 PG (ref 27–33)
MCHC RBC AUTO-ENTMCNC: 33.3 G/DL (ref 32–36)
MCV RBC AUTO: 84.7 FL (ref 80–100)
MONOCYTES # BLD AUTO: 520 CELLS/UL (ref 200–950)
MONOCYTES NFR BLD AUTO: 12.1 %
NEUTROPHILS # BLD AUTO: 2318 CELLS/UL (ref 1500–7800)
NEUTROPHILS NFR BLD AUTO: 53.9 %
NONHDLC SERPL-MCNC: 100 MG/DL (CALC)
PLATELET # BLD AUTO: 282 THOUSAND/UL (ref 140–400)
PMV BLD REES-ECKER: 10 FL (ref 7.5–12.5)
POTASSIUM SERPL-SCNC: 3.9 MMOL/L (ref 3.5–5.3)
PROT SERPL-MCNC: 6.4 G/DL (ref 6.1–8.1)
RBC # BLD AUTO: 4.64 MILLION/UL (ref 3.8–5.1)
SODIUM SERPL-SCNC: 140 MMOL/L (ref 135–146)
TIBC SERPL-MCNC: 372 MCG/DL (CALC) (ref 250–450)
TRIGL SERPL-MCNC: 66 MG/DL
TSH SERPL-ACNC: 1.79 MIU/L
WBC # BLD AUTO: 4.3 THOUSAND/UL (ref 3.8–10.8)

## 2023-10-25 NOTE — PROGRESS NOTES
I sent pt a my chart message -  I reviewed your labs. Your thyroid functions are normal.  Your Cholesterol looked good.  Your kidney function and liver functions looked good. Your iron studies remain borderline  low but have improved.  I would resume iron 3 times/wk and recommend you reconsider the scopes Dr. Sinclair recommended. No further recommendations at this time.    Dr. SAVAGE

## 2024-01-08 PROBLEM — Z00.00 NORMAL PHYSICAL EXAM, ROUTINE: Status: RESOLVED | Noted: 2023-10-09 | Resolved: 2024-01-08

## 2024-01-08 PROBLEM — J01.90 ACUTE RHINOSINUSITIS: Status: RESOLVED | Noted: 2023-10-09 | Resolved: 2024-01-08

## 2024-01-18 ENCOUNTER — OFFICE VISIT (OUTPATIENT)
Dept: URGENT CARE | Facility: CLINIC | Age: 54
End: 2024-01-18
Payer: COMMERCIAL

## 2024-01-18 VITALS
HEART RATE: 85 BPM | WEIGHT: 147 LBS | OXYGEN SATURATION: 98 % | HEIGHT: 65 IN | TEMPERATURE: 97 F | BODY MASS INDEX: 24.49 KG/M2 | DIASTOLIC BLOOD PRESSURE: 76 MMHG | SYSTOLIC BLOOD PRESSURE: 117 MMHG | RESPIRATION RATE: 18 BRPM

## 2024-01-18 DIAGNOSIS — S05.02XA ABRASION OF LEFT CORNEA, INITIAL ENCOUNTER: Primary | ICD-10-CM

## 2024-01-18 PROCEDURE — 99203 OFFICE O/P NEW LOW 30 MIN: CPT | Mod: S$GLB,,, | Performed by: NURSE PRACTITIONER

## 2024-01-18 RX ORDER — POLYMYXIN B SULFATE AND TRIMETHOPRIM 1; 10000 MG/ML; [USP'U]/ML
1 SOLUTION OPHTHALMIC EVERY 4 HOURS
Qty: 10 ML | Refills: 0 | Status: SHIPPED | OUTPATIENT
Start: 2024-01-18 | End: 2024-01-25

## 2024-01-18 RX ORDER — PROPARACAINE HYDROCHLORIDE 5 MG/ML
1 SOLUTION/ DROPS OPHTHALMIC
Status: COMPLETED | OUTPATIENT
Start: 2024-01-18 | End: 2024-01-18

## 2024-01-18 RX ADMIN — PROPARACAINE HYDROCHLORIDE 1 DROP: 5 SOLUTION/ DROPS OPHTHALMIC at 06:01

## 2024-01-19 NOTE — PATIENT INSTRUCTIONS
Avoid rubbing eyes  Good handwashing  Cool compresses for irritation   Avoid eye make-up and contact lenses, if applicable

## 2024-01-19 NOTE — PROGRESS NOTES
"Subjective:      Patient ID: Carie Howell is a 53 y.o. female.    Vitals:  height is 5' 5" (1.651 m) and weight is 66.7 kg (147 lb). Her tympanic temperature is 97 °F (36.1 °C). Her blood pressure is 117/76 and her pulse is 85. Her respiration is 18 and oxygen saturation is 98%.     Chief Complaint: Eye Problem    This is a 53 y.o. female who presents today with a chief complaint of  left eye problem, with redness, light-sensitivity, and tender to the touch around the eye area. Pt wears daily contact lenses and is compliant at changing them often. She denies any eye trouble when removing contact lens from eye, last night; but when she awoke this morning, she was in significant pain. Denies eye injury and can't think of anything getting in her eye. Denies stye or any bumps.     Eye Problem   The left eye is affected. This is a new problem. The problem has been gradually worsening. The injury mechanism is unknown. The pain is at a severity of 8/10. There is No known exposure to pink eye. She Wears contacts. Associated symptoms include eye redness and photophobia. Pertinent negatives include no blurred vision, eye discharge, double vision, fever, foreign body sensation, nausea, recent URI or vomiting. She has tried eye drops for the symptoms.       Constitution: Negative for fever.   Eyes:  Positive for eye pain, eye redness and photophobia. Negative for eye discharge, double vision and blurred vision.   Gastrointestinal:  Negative for nausea and vomiting.      Objective:     Physical Exam   Constitutional: She is oriented to person, place, and time.   HENT:   Head: Normocephalic.   Nose: Nose normal.   Mouth/Throat: Mucous membranes are moist.   Eyes: Lids are normal. Pupils are equal, round, and reactive to light. Lids are everted and swept, no foreign bodies found. Right eye exhibits no chemosis, no discharge, no exudate and no hordeolum. Left eye exhibits discharge (tears; no purulent discharge). Left eye " exhibits no chemosis, no exudate and no hordeolum. No foreign body present in the left eye. Right conjunctiva is not injected. Right conjunctiva has no hemorrhage. Left conjunctiva is injected. Left conjunctiva has no hemorrhage. Right eye exhibits normal extraocular motion and no nystagmus. Left eye exhibits normal extraocular motion and no nystagmus. Extraocular movement intact vision grossly intact gaze aligned appropriately      Comments: Pt having difficulty keeping eye open. Positive light sensitivity.  Corneal abrasion noted under woods lamp with use of fluorescein and proparacaine    Cardiovascular: Normal rate.   Pulmonary/Chest: Effort normal.   Abdominal: Normal appearance.   Musculoskeletal: Normal range of motion.         General: Normal range of motion.   Neurological: She is alert and oriented to person, place, and time.   Psychiatric: Her behavior is normal. Mood normal.   Nursing note and vitals reviewed.      Assessment:     1. Abrasion of left cornea, initial encounter        Plan:       Abrasion of left cornea, initial encounter  -     proparacaine 0.5 % ophthalmic solution 1 drop  -     fluorescein-benoxinate 0.25-0.4% ophthalmic solution 1 drop  -     polymyxin B sulf-trimethoprim (POLYTRIM) 10,000 unit- 1 mg/mL Drop; Place 1 drop into the left eye every 4 (four) hours. for 7 days  Dispense: 10 mL; Refill: 0

## 2024-08-29 LAB — BCS RECOMMENDATION EXT: NORMAL

## 2024-09-12 ENCOUNTER — PATIENT OUTREACH (OUTPATIENT)
Dept: ADMINISTRATIVE | Facility: HOSPITAL | Age: 54
End: 2024-09-12
Payer: COMMERCIAL

## 2024-09-12 NOTE — PROGRESS NOTES
Health Maintenance Due   Topic Date Due    Hepatitis C Screening  Never done    HIV Screening  Never done    Shingles Vaccine (1 of 2) Never done    Influenza Vaccine (1) 09/01/2024    COVID-19 Vaccine (3 - 2023-24 season) 09/01/2024     Chart review completed. HM Updated. Triggered Links. Immunizations reviewed and updated. Care Everywhere Updated. Care Team Updated.  Imported outside mammogram results from [facility] into /media.

## 2024-10-25 ENCOUNTER — OFFICE VISIT (OUTPATIENT)
Dept: URGENT CARE | Facility: CLINIC | Age: 54
End: 2024-10-25
Payer: COMMERCIAL

## 2024-10-25 VITALS
HEART RATE: 92 BPM | WEIGHT: 147 LBS | BODY MASS INDEX: 24.49 KG/M2 | OXYGEN SATURATION: 99 % | HEIGHT: 65 IN | TEMPERATURE: 98 F | RESPIRATION RATE: 18 BRPM | SYSTOLIC BLOOD PRESSURE: 91 MMHG | DIASTOLIC BLOOD PRESSURE: 61 MMHG

## 2024-10-25 DIAGNOSIS — H10.022 MUCOPURULENT CONJUNCTIVITIS OF LEFT EYE: Primary | ICD-10-CM

## 2024-10-25 RX ORDER — OFLOXACIN 3 MG/ML
SOLUTION/ DROPS OPHTHALMIC
Qty: 5 ML | Refills: 0 | Status: SHIPPED | OUTPATIENT
Start: 2024-10-25 | End: 2024-10-31

## 2024-10-25 NOTE — PROGRESS NOTES
"Subjective:      Patient ID: Carie Howell is a 54 y.o. female.    Vitals:  height is 5' 5" (1.651 m) and weight is 66.7 kg (147 lb). Her oral temperature is 98 °F (36.7 °C). Her blood pressure is 91/61 and her pulse is 92. Her respiration is 18 and oxygen saturation is 99%.     Chief Complaint: Eye Problem    54 yr old  female came in with complaints of a left eye redness, discharge, mild itching, irritation that began this morning.  She does wear contacts but does not sleep with them in.  She threw the contacts out.  No URI symptoms or fever.  No changes in vision.  No severe pain.  Patient states that discharge is mucus and crusty.     Eye Problem   The left eye is affected. This is a new problem. The current episode started yesterday. The problem occurs constantly. The problem has been gradually worsening. There was no injury mechanism. The pain is at a severity of 0/10. The pain is mild. There is No known exposure to pink eye. She Does not wear contacts. Associated symptoms include an eye discharge, eye redness and itching. Pertinent negatives include no blurred vision, double vision, fever, foreign body sensation, nausea, photophobia, recent URI or vomiting. She has tried nothing for the symptoms.     Constitution: Negative for chills, sweating, fatigue and fever.   HENT:  Negative for congestion and sore throat.    Neck: Negative for neck pain and neck stiffness.   Cardiovascular:  Negative for chest pain, leg swelling and palpitations.   Eyes:  Positive for eye discharge, eye itching and eye redness. Negative for eye trauma, foreign body in eye, eye pain, photophobia, vision loss, double vision and blurred vision.   Respiratory:  Negative for cough, sputum production and shortness of breath.    Gastrointestinal:  Negative for abdominal pain, nausea, vomiting and diarrhea.   Genitourinary:  Negative for dysuria, frequency and urgency.   Musculoskeletal:  Negative for pain and joint swelling.   Skin:  " Negative for color change and rash.   Neurological:  Negative for dizziness, headaches, disorientation, altered mental status, numbness and tingling.   Psychiatric/Behavioral:  Negative for altered mental status and disorientation.       Objective:     Physical Exam   Constitutional: She is oriented to person, place, and time. She appears well-developed.  Non-toxic appearance. She does not appear ill. No distress.   HENT:   Head: Normocephalic and atraumatic.   Ears:   Right Ear: External ear normal.   Left Ear: External ear normal.   Eyes: Lids are normal. Pupils are equal, round, and reactive to light. Right eye exhibits no discharge. Left eye exhibits exudate. Left eye exhibits no discharge. Left conjunctiva is injected. No scleral icterus.   Slit lamp exam:       The left eye shows no corneal abrasion. Extraocular movement intact vision grossly intact gaze aligned appropriately      Comments: EOMI PERRLALeft eye conjunctival injection with some mucopurulent discharge.  Fluorescein exam is negative.  No acute lid or periorbital abnormality.   Pulmonary/Chest: Effort normal. No stridor. No respiratory distress. She has no wheezes.   Neurological: She is alert and oriented to person, place, and time.   Skin: Skin is not diaphoretic.   Psychiatric: Her behavior is normal. Judgment and thought content normal.   Nursing note and vitals reviewed.      Vision Screening    Right eye Left eye Both eyes   Without correction      With correction 20/40 20/30 20/20       Assessment:     1. Mucopurulent conjunctivitis of left eye        Plan:       Mucopurulent conjunctivitis of left eye  -     ofloxacin (OCUFLOX) 0.3 % ophthalmic solution; Place 1 drop into the left eye every 4 (four) hours for 2 days, THEN 1 drop every 6 (six) hours for 5 days.  Dispense: 5 mL; Refill: 0      Discussed dx, home care, tx, and given follow up precautions.  I have reviewed the patient's chart to view previous visits, labs, and imaging to assess  PMH and look for any trends or previous treatments.

## 2024-11-17 ENCOUNTER — OFFICE VISIT (OUTPATIENT)
Dept: URGENT CARE | Facility: CLINIC | Age: 54
End: 2024-11-17
Payer: COMMERCIAL

## 2024-11-17 VITALS
RESPIRATION RATE: 18 BRPM | WEIGHT: 139 LBS | SYSTOLIC BLOOD PRESSURE: 93 MMHG | DIASTOLIC BLOOD PRESSURE: 61 MMHG | OXYGEN SATURATION: 98 % | TEMPERATURE: 98 F | HEIGHT: 65 IN | BODY MASS INDEX: 23.16 KG/M2 | HEART RATE: 78 BPM

## 2024-11-17 DIAGNOSIS — H10.022 PINK EYE, LEFT: Primary | ICD-10-CM

## 2024-11-17 PROCEDURE — 99213 OFFICE O/P EST LOW 20 MIN: CPT | Mod: S$GLB,,, | Performed by: NURSE PRACTITIONER

## 2024-11-17 RX ORDER — OFLOXACIN 3 MG/ML
1 SOLUTION/ DROPS OPHTHALMIC EVERY 4 HOURS
Qty: 10 ML | Refills: 0 | Status: SHIPPED | OUTPATIENT
Start: 2024-11-17 | End: 2024-11-24

## 2024-11-17 NOTE — PROGRESS NOTES
"Subjective:      Patient ID: Carie Howell is a 54 y.o. female.    Vitals:  height is 5' 5" (1.651 m) and weight is 63 kg (139 lb). Her oral temperature is 98.4 °F (36.9 °C). Her blood pressure is 93/61 and her pulse is 78. Her respiration is 18 and oxygen saturation is 98%.     Chief Complaint: Eye Problem    This is a 54 y.o. female who presents today with a chief complaint of redness and irritation to globe of left eye. Pt wears contacts and has had this trouble in the past. 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 Eyes are crusty and red, per pt. Symptoms began today. Wears contacts-the kind you change out daily.     Eye Problem   Both eyes are affected. This is a new problem. The current episode started today. The problem has been unchanged. There was no injury mechanism. The pain is at a severity of 0/10. The patient is experiencing no pain. There is No known exposure to pink eye. She Does not wear contacts. Associated symptoms include an eye discharge, eye redness and itching. Pertinent negatives include no fever. She has tried nothing for the symptoms. The treatment provided no relief.       Constitution: Negative for fever.   HENT:  Negative for congestion.    Eyes:  Positive for eye discharge, eye itching and eye redness. Negative for eyelid swelling.   Respiratory:  Negative for cough.       Objective:     Physical Exam   Constitutional: She is oriented to person, place, and time.  Non-toxic appearance. She does not appear ill. No distress.   HENT:   Head: Normocephalic.   Eyes: Lids are normal. Right eye exhibits no discharge and no exudate. Left eye exhibits exudate. Left eye exhibits no discharge. Right conjunctiva is not injected. Left conjunctiva is injected.   Cardiovascular: Normal rate.   Pulmonary/Chest: Effort normal.   Abdominal: Normal appearance.   Musculoskeletal: Normal range of motion.         General: Normal range of motion.   Neurological: She is alert and oriented to person, " place, and time.   Skin: Skin is not diaphoretic.   Psychiatric: Her behavior is normal. Mood normal.   Nursing note and vitals reviewed.      Assessment:     1. Pink eye, left        Plan:       Pink eye, left  -     ofloxacin (OCUFLOX) 0.3 % ophthalmic solution; Place 1 drop into the left eye every 4 (four) hours. for 7 days  Dispense: 10 mL; Refill: 0      Patient Instructions   Avoid rubbing eyes  Good handwashing  Warm compresses to help remove discharge, and for comfort  Alternate with cool compresses to help with swelling, if needed  This is contagious, so wipe down surfaces after touching  Avoid eye make-up and contact lenses, if applicable   Do not use same eye make up, anymore-if applicable   Change pillow case nightly as needed

## 2024-11-17 NOTE — PATIENT INSTRUCTIONS
Avoid rubbing eyes  Good handwashing  Warm compresses to help remove discharge, and for comfort  Alternate with cool compresses to help with swelling, if needed  This is contagious, so wipe down surfaces after touching  Avoid eye make-up and contact lenses, if applicable   Do not use same eye make up, anymore-if applicable   Change pillow case nightly as needed

## 2024-12-21 ENCOUNTER — OFFICE VISIT (OUTPATIENT)
Dept: URGENT CARE | Facility: CLINIC | Age: 54
End: 2024-12-21
Payer: COMMERCIAL

## 2024-12-21 VITALS
SYSTOLIC BLOOD PRESSURE: 110 MMHG | RESPIRATION RATE: 20 BRPM | WEIGHT: 139 LBS | TEMPERATURE: 98 F | BODY MASS INDEX: 23.16 KG/M2 | HEIGHT: 65 IN | DIASTOLIC BLOOD PRESSURE: 75 MMHG | HEART RATE: 90 BPM | OXYGEN SATURATION: 98 %

## 2024-12-21 DIAGNOSIS — H01.004 BLEPHARITIS OF LEFT UPPER EYELID, UNSPECIFIED TYPE: ICD-10-CM

## 2024-12-21 DIAGNOSIS — T15.92XA FOREIGN BODY OF LEFT EYE, INITIAL ENCOUNTER: Primary | ICD-10-CM

## 2024-12-21 DIAGNOSIS — S05.02XA ABRASION OF LEFT CORNEA, INITIAL ENCOUNTER: ICD-10-CM

## 2024-12-21 PROCEDURE — 99213 OFFICE O/P EST LOW 20 MIN: CPT | Mod: S$GLB,,,

## 2024-12-21 RX ORDER — ERYTHROMYCIN 5 MG/G
OINTMENT OPHTHALMIC 4 TIMES DAILY
Qty: 3.5 G | Refills: 0 | Status: SHIPPED | OUTPATIENT
Start: 2024-12-21 | End: 2024-12-28

## 2024-12-21 NOTE — PATIENT INSTRUCTIONS
Apply erythromycin ointment 4 times a day for 7 days to left eye.  Please follow up with your ophthalmologist.  Apply warm compress to left upper eyelid daily.    When do I need to call the doctor?   You have a change in your eyesight.  You have very bad eye pain.  You have ongoing pain in your eye or are bothered by bright lights after 1 to 2 days.  Your eye pain starts to get worse.  You have a fever of 100.4°F (38°C) or higher or chills.  You have signs of an eye infection like swelling, redness, warmth, pain, or drainage from the eye.  - Rest.    - Drink plenty of fluids.    - Acetaminophen (tylenol) or Ibuprofen (advil,motrin) as directed as needed for fever/pain. Avoid tylenol if you have a history of liver disease. Do not take ibuprofen if you have a history of GI bleeding, kidney disease, or if you take blood thinners.     - Follow up with your PCP or specialty clinic as directed in the next 1-2 weeks if not improved or as needed.  You can call [phone] to schedule an appointment with the appropriate provider.    - Go to the ER or seek medical attention immediately if you develop new or worsening symptoms.     - You must understand that you have received an Urgent Care treatment only and that you may be released before all of your medical problems are known or treated.   - You, the patient, will arrange for follow up care as instructed.   - If your condition worsens or fails to improve we recommend that you receive another evaluation at the ER immediately or contact your PCP to discuss your concerns or return here.

## 2024-12-21 NOTE — PROGRESS NOTES
"Subjective:      Patient ID: Carie Howell is a 54 y.o. female.    Vitals:  height is 5' 5" (1.651 m) and weight is 63 kg (139 lb). Her oral temperature is 97.7 °F (36.5 °C). Her blood pressure is 110/75 and her pulse is 90. Her respiration is 20 and oxygen saturation is 98%.     Chief Complaint: Eye Problem    This is a 54 y.o. female presents with left eye redness, light sensitivity,  pain after she took off her contact lenses yesterday.  She does have a history of reoccurring corneal abrasions.  Patient also presents with swelling to the left upper eyelid.  She states she took an old prescription of antibiotics eyedrops.  She denies any loss or change in vision. PERRLA.  She denies any known trauma or injury.    Eye Problem   The left eye is affected. This is a new problem. The current episode started yesterday. The problem occurs constantly. The problem has been gradually worsening. There was no injury mechanism. The pain is at a severity of 8/10. The pain is moderate. There is No known exposure to pink eye. She Wears contacts. Associated symptoms include eye redness and photophobia. Pertinent negatives include no blurred vision, eye discharge, double vision, fever, foreign body sensation, itching, nausea, recent URI or vomiting. She has tried eye drops for the symptoms. The treatment provided no relief.       Constitution: Negative for activity change, appetite change, chills, sweating and fever.   HENT:  Negative for ear pain, ear discharge, foreign body in ear, tinnitus, hearing loss, sore throat, trouble swallowing and voice change.    Neck: Negative for neck pain, neck stiffness, painful lymph nodes and neck swelling.   Cardiovascular:  Negative for chest trauma, chest pain, leg swelling and palpitations.   Eyes:  Positive for eye pain, eye redness and photophobia. Negative for eye trauma, foreign body in eye, eye discharge, eye itching, vision loss, double vision and blurred vision.   Respiratory:  " Negative for sleep apnea, chest tightness, cough and asthma.    Gastrointestinal:  Negative for abdominal trauma, abdominal pain, abdominal bloating, history of abdominal surgery, nausea, vomiting and bowel incontinence.   Endocrine: hair loss, cold intolerance and heat intolerance.   Genitourinary:  Negative for dysuria, frequency, urgency, urine decreased and flank pain.   Musculoskeletal:  Negative for pain, trauma, joint pain and joint swelling.   Skin:  Negative for color change, pale and rash.   Allergic/Immunologic: Negative for environmental allergies, seasonal allergies, food allergies, eczema and asthma.   Neurological:  Negative for dizziness, history of vertigo, light-headedness, passing out, disorientation and altered mental status.   Hematologic/Lymphatic: Negative for swollen lymph nodes, easy bruising/bleeding, trouble clotting and history of blood clots. Does not bruise/bleed easily.   Psychiatric/Behavioral:  Negative for altered mental status, disorientation, confusion, agitation and nervous/anxious. The patient is not nervous/anxious.       Objective:     Physical Exam   Constitutional: She is oriented to person, place, and time. She appears well-developed. She is cooperative. No distress.   HENT:   Head: Normocephalic and atraumatic.   Nose: Nose normal.   Mouth/Throat: Oropharynx is clear and moist and mucous membranes are normal.   Eyes: Conjunctivae and lids are normal.   Slit lamp exam:       The left eye shows fluorescein uptake.          Comments: Eyes flushed prior to procedure.  Patient reports improvement of eye pain after proparacaine eye drop.  Possible unknown foreign body to left eye noted.  Unable to be flushed.   Neck: Trachea normal and phonation normal. Neck supple.   Cardiovascular: Normal rate, regular rhythm, normal heart sounds and normal pulses.   Pulmonary/Chest: Effort normal and breath sounds normal.   Abdominal: Normal appearance and bowel sounds are normal. She  exhibits no mass. Soft.   Musculoskeletal:         General: No deformity.   Neurological: She is alert and oriented to person, place, and time. She has normal strength and normal reflexes. No sensory deficit.   Skin: Skin is warm, dry, intact and not diaphoretic.   Psychiatric: Her speech is normal and behavior is normal. Judgment and thought content normal.   Nursing note and vitals reviewed.      Assessment:     1. Foreign body of left eye, initial encounter    2. Abrasion of left cornea, initial encounter    3. Blepharitis of left upper eyelid, unspecified type      Vision Screening    Right eye Left eye Both eyes   Without correction      With correction 20/20 20/15 20/15       Plan:       Foreign body of left eye, initial encounter  -     erythromycin (ROMYCIN) ophthalmic ointment; Place into the left eye 4 (four) times daily. for 7 days  Dispense: 3.5 g; Refill: 0    Abrasion of left cornea, initial encounter  -     erythromycin (ROMYCIN) ophthalmic ointment; Place into the left eye 4 (four) times daily. for 7 days  Dispense: 3.5 g; Refill: 0    Blepharitis of left upper eyelid, unspecified type  -     erythromycin (ROMYCIN) ophthalmic ointment; Place into the left eye 4 (four) times daily. for 7 days  Dispense: 3.5 g; Refill: 0

## 2025-01-09 ENCOUNTER — HOSPITAL ENCOUNTER (EMERGENCY)
Facility: HOSPITAL | Age: 55
Discharge: HOME | End: 2025-01-09
Attending: EMERGENCY MEDICINE
Payer: COMMERCIAL

## 2025-01-09 VITALS
TEMPERATURE: 98 F | RESPIRATION RATE: 18 BRPM | WEIGHT: 138.88 LBS | OXYGEN SATURATION: 99 % | DIASTOLIC BLOOD PRESSURE: 75 MMHG | HEART RATE: 79 BPM | HEIGHT: 65 IN | SYSTOLIC BLOOD PRESSURE: 111 MMHG | BODY MASS INDEX: 23.14 KG/M2

## 2025-01-09 DIAGNOSIS — H16.009 CORNEAL ULCER, UNSPECIFIED LATERALITY: Primary | ICD-10-CM

## 2025-01-09 PROCEDURE — 87102 FUNGUS ISOLATION CULTURE: CPT

## 2025-01-09 PROCEDURE — 87075 CULTR BACTERIA EXCEPT BLOOD: CPT

## 2025-01-09 PROCEDURE — 99283 EMERGENCY DEPT VISIT LOW MDM: CPT

## 2025-01-09 PROCEDURE — 87070 CULTURE OTHR SPECIMN AEROBIC: CPT

## 2025-01-09 PROCEDURE — 25000003 PHARM REV CODE 250: Performed by: PHYSICIAN ASSISTANT

## 2025-01-09 RX ORDER — ATROPINE SULFATE 10 MG/ML
1 SOLUTION/ DROPS OPHTHALMIC 2 TIMES DAILY
Qty: 5 ML | Refills: 0 | Status: SHIPPED | OUTPATIENT
Start: 2025-01-09 | End: 2025-01-10

## 2025-01-09 RX ORDER — TETRACAINE HYDROCHLORIDE 5 MG/ML
2 SOLUTION OPHTHALMIC
Status: COMPLETED | OUTPATIENT
Start: 2025-01-09 | End: 2025-01-09

## 2025-01-09 RX ORDER — VALACYCLOVIR HYDROCHLORIDE 1 G/1
1000 TABLET, FILM COATED ORAL 3 TIMES DAILY
Qty: 21 TABLET | Refills: 0 | Status: SHIPPED | OUTPATIENT
Start: 2025-01-09 | End: 2025-01-16

## 2025-01-09 RX ADMIN — FLUORESCEIN SODIUM 1 EACH: 1 STRIP OPHTHALMIC at 07:01

## 2025-01-09 RX ADMIN — TETRACAINE HYDROCHLORIDE 2 DROP: 5 SOLUTION OPHTHALMIC at 07:01

## 2025-01-09 NOTE — CONSULTS
[facility]   Consult Service       Chief complaint/Reason for Consult: Corneal ulcer     History of Present Illness: Carie Howell is a 54 y.o. female who presents with known corneal ulcer for increased pain.     Patient originally seen December 20th at [facility] and diagnosed with corneal ulcer. Was on several drops at the time and unable to name them. States ulcer has improved since then in size but still with continued pain which prompted presentation to [facility].     Patient stated she has had previous episodes of similar ulcers not associated with contact use. Had chickenpox as a teenager. Denies mouth sores, oral lesion, skin lesions.     Patient is contact lens wearer, uses daily's and reports good hygiene.     Patient has appointment with cornea specialist Dr. Rivera tomorrow at [facility]. Currently on moxifloxacin TID and doxycycline oral.       Past Ocular Hx: No past ocular surgeries, Uses contacts    Current eye gtts: None      PMHx:  has a past medical history of GERD (gastroesophageal reflux disease) and Hearing impairment.     PSurgHx:  has a past surgical history that includes Vaginal delivery.     Medications: Reviewed     Allergies: has No Known Allergies.     Social Hx:  reports that she has never smoked. She has never been exposed to tobacco smoke. She has never used smokeless tobacco. She reports current alcohol use. She reports that she does not use drugs.     Family Hx: No family history of glaucoma, macular degeneration    ROS: Negative x 10 except for complaints as described in HPI     Ocular examination/Dilated fundus examination:  Base Eye Exam       Visual Acuity (Snellen - Linear)         Right Left    Dist cc 20/20 20/20              Tonometry (Tonopen, 9:57 AM)         Right Left    Pressure 15 16              Pupils         Pupils    Right PERRL    Left PERRL              Visual Fields         Right Left     Full Full              Extraocular Movement          Right Left     Full, Ortho Full, Ortho              Neuro/Psych       Mood/Affect: Normal              Dilation       Both eyes: 0.5% Mydriacyl, 2.5% Phenylephrine @ 9:56 AM                  Slit Lamp and Fundus Exam       External Exam         Right Left    External Normal Normal              Slit Lamp Exam         Right Left    Lids/Lashes Normal Normal    Conjunctiva/Sclera White and quiet White and quiet    Cornea Clear 1.5mm paracentral ulcer at 4:30, No thinning    Anterior Chamber Deep and quiet 1+ Cell, No hypopyon    Iris Round and reactive Round and reactive    Lens 1+ Nuclear sclerosis 1+ Nuclear sclerosis    Anterior Vitreous Normal Normal              Fundus Exam         Right Left    Disc Pink/Sharp Pink/Sharp    C/D Ratio 0.3 0.3    Macula Flat Flat    Vessels Normal caliber Normal caliber    Periphery Flat, No heme/tears/detachment 360 Flat, No heme/tears/detachment 360    Poor view in both eyes 2/2 photophobia                    Assessment/Plan:   1. Corneal Ulcer, Left Eye   - Hx of contact use; Diagnosed by outside practice in December, improving in size per patient;   - Previous episodes of cornea ulcers in past that spontaneous resolved; Hx of chickenpox, no oral/skin lesions  - Presented to ED for pain OS, On Moxifloxacin TID   - Exam significant for 1.5mm paracentral ulcer at 4 o clock; 1+ cell in AC, No hypopyon, No thinning   - Patient with appointment with Dr. Rivera (Cornea) tomorrow and would like to follow up at established practice   - Cultures taken from cornea swab  - Will start cycloplegia given photophobia and AC rxn and increase moxifloxacin to 6x per day   - Start Valtrex 1g TID given history of likely sterile ulcers and hx of varicella    Recommendations   - No contact lens use in left eye   - Increase Moxifloxacin to 6x per day to Left Eye    - Start Atropine BID to Left Eye   - Start Preservative Free Artifical Tears 4-6x per day to Left Eye   - Start Valtrex 1g TID  for 14 days   - Follow up with cornea specialist tomorrow (appointment with Dr. Rivera)    Discussed patient's history, physical, assessment and plan with the attending, Dr. Ni Dupree MD   [facility], PGY-2

## 2025-01-09 NOTE — ED TRIAGE NOTES
Patient identifiers for Carie Howell 54 y.o. female checked and correct.  Chief Complaint   Patient presents with    Eye Pain     Pt is S/P L corneal abrasion/ ulcer 12/20. Has apt w/ corneal specialist tomorrow but pain is unbearable.      Past Medical History:   Diagnosis Date    GERD (gastroesophageal reflux disease)     Hearing impairment      Allergies reported: Review of patient's allergies indicates:  No Known Allergies      LOC: Patient is awake, alert, and aware of environment with an appropriate affect. Patient is oriented x 4 and speaking appropriately.  APPEARANCE: Patient resting comfortably and in no acute distress. Patient is clean and well groomed, patient's clothing is properly fastened.  HEENT: left eye pain  SKIN: The skin is warm and dry. Patient has normal skin turgor and moist mucus membranes.   MUSKULOSKELETAL: Patient is moving all extremities well, no obvious deformities noted. Pulses intact.   RESPIRATORY: Airway is open and patent. Respirations are spontaneous and non-labored with normal effort and rate.  CARDIAC: Patient has a normal rate and rhythm. Normal sinus on cardiac monitor. No peripheral edema noted.   ABDOMEN: No distention noted. Soft and non-tender upon palpation.  NEUROLOGICAL, PERRL. Facial expression is symmetrical. Hand grasps are equal bilaterally. Normal sensation in all extremities when touched with finger.

## 2025-01-09 NOTE — ED PROVIDER NOTES
Encounter Date: 1/9/2025       History     Chief Complaint   Patient presents with    Eye Pain     Pt is S/P L corneal abrasion/ ulcer 12/20. Has apt w/ corneal specialist tomorrow but pain is unbearable.      Pt is a 54-year-old female with no significant medical history who presents to the emergency department with left eye pain.  Patient reports at the beginning of December she began with the eye pain and went to urgent care.  Reports she was diagnosed with a corneal abrasion.  Reports she went to urgent care for different times because the symptoms never improved.  Reports she has been on steroid drops and antibiotic drops.  Reports last week she was referred to an ophthalmologist at [facility].  Reports she was diagnosed with a corneal ulcer.  Reports he switch the topical medications to a different antibiotic.  Reports she had a follow up visit on Tuesday and was told the ulcer is worsening.  Reports she was referred to a corneal specialist but the pain is too severe and she has not been able to see the specialist yet.  She is a contact wearer but has not been wearing her contact since these symptoms started in December.    The history is provided by the patient.     Review of patient's allergies indicates:  No Known Allergies  Past Medical History:   Diagnosis Date    GERD (gastroesophageal reflux disease)     Hearing impairment      Past Surgical History:   Procedure Laterality Date    VAGINAL DELIVERY      x 2     Family History   Problem Relation Name Age of Onset    Coronary artery disease Mother  67    Coronary artery disease Father  70    Heart attack Father  70    Heart disease Brother  55        s/p PCI     Social History     Tobacco Use    Smoking status: Never     Passive exposure: Never    Smokeless tobacco: Never   Substance Use Topics    Alcohol use: Yes     Comment: socially    Drug use: Never     Review of Systems   Constitutional:  Negative for activity change, appetite change,  chills, fatigue and fever.   HENT:  Negative for congestion, ear discharge, ear pain, postnasal drip, rhinorrhea and sore throat.    Eyes:  Positive for photophobia, pain, redness and visual disturbance.   Respiratory:  Negative for cough and shortness of breath.    Cardiovascular:  Negative for chest pain.   Gastrointestinal:  Negative for abdominal pain.   Genitourinary:  Negative for dysuria.   Musculoskeletal:  Negative for back pain.   Skin:  Negative for rash and wound.   Neurological:  Negative for dizziness and headaches.       Physical Exam     Initial Vitals [01/09/25 0700]   BP Pulse Resp Temp SpO2   121/62 86 16 97.3 °F (36.3 °C) 99 %      MAP       --         Physical Exam    Nursing note and vitals reviewed.  Constitutional: She appears well-developed and well-nourished. She is not diaphoretic.  Non-toxic appearance. No distress.   HENT:   Head: Normocephalic.   Right Ear: Hearing and external ear normal.   Left Ear: Hearing and external ear normal.   Nose: Nose normal. Mouth/Throat: Oropharynx is clear and moist.   Eyes: Left conjunctiva is injected.   Cardiovascular:  Normal rate and regular rhythm.           Pulmonary/Chest: Breath sounds normal.   Abdominal: Abdomen is soft. Bowel sounds are normal.   Musculoskeletal:         General: Normal range of motion.     Neurological: She is alert and oriented to person, place, and time.   Skin: Skin is warm and dry. Capillary refill takes less than 2 seconds.   Psychiatric: She has a normal mood and affect.         ED Course   Procedures  Labs Reviewed   CULTURE, ANAEROBIC   CULTURE, AEROBIC  (SPECIFY SOURCE)   CULTURE, FUNGUS          Imaging Results    None          Medications   TETRAcaine HCl (PF) 0.5 % Drop 2 drop (2 drops Left Eye Given 1/9/25 0753)   fluorescein ophthalmic strip 1 each (1 each Left Eye Given 1/9/25 0753)     Medical Decision Making  Urgent evaluation of a 54-year-old female who presents to the emergency department with left eye  discomfort.  Patient is afebrile and nontoxic appearing.  She has been diagnosed with a corneal ulcer that is worsening.  She is scheduled to see a cornea specialist, but the pain is too severe to wait.  Exam is limited secondary to pain.  Will order tetracaine to better examined.  Will reach out to ophthalmology.    7:43 AM  Dr. Dupree will come down to evaluate.    10:52 AM  Dr. Dupree and staff have cultured eye.  Advised discharge with atropine just until tomorrow for comfort.  Advised to cover with Valtrex.  Advised to follow up with specialist tomorrow at scheduled appointment.    Risk  Prescription drug management.                                      Clinical Impression:  Final diagnoses:  [H16.009] Corneal ulcer, unspecified laterality (Primary)          ED Disposition Condition    Discharge Stable          ED Prescriptions       Medication Sig Dispense Start Date End Date Auth. Provider    atropine 1% (ISOPTO ATROPINE) 1 % Drop Place 1 drop into the left eye 2 (two) times a day. for 1 day 5 mL 1/9/2025 1/10/2025 Tita Richter PA-C    valACYclovir (VALTREX) 1000 MG tablet Take 1 tablet (1,000 mg total) by mouth 3 (three) times daily. for 7 days 21 tablet 1/9/2025 1/16/2025 Tita Richter PA-C          Follow-up Information       Follow up With Specialties Details Why Contact Info    [facility]   at your scheduled appt tomorrow              Tita Richter PA-C  01/09/25 8732

## 2025-01-13 LAB — BACTERIA SPEC AEROBE CULT: NO GROWTH

## 2025-01-16 LAB — BACTERIA SPEC ANAEROBE CULT: NORMAL

## 2025-01-24 LAB
FUNGUS SPEC CULT: NORMAL
FUNGUS SPEC CULT: NORMAL

## 2025-02-28 LAB — PAP RECOMMENDATION EXT: NORMAL

## 2025-03-06 NOTE — PROGRESS NOTES
[facility] Note    Chief Complaint      Chief Complaint   Patient presents with    Annual Exam       History of Present Illness      Carie Howell is a 54 y.o.   WF presents to fu well visit . Last visit - 10/9/23    Interim:  UC - 1/18/24 - Left corneal abrasion  UC - 10/25/24 - Mucopurulent conjunctivitis Left eye - tx with ofloxacin.   UC - 11/17/24 - Left pink eye - Tx with ofloxacin  UC - 12/21/24 - Left eye foreign body - Tx with erythromycin ointment  ED - 1/9/25  - Corneal ulcer - Tx with Valtrex  Pt saw Dr. Rivera at [facility] and was given a corneal transplant 2/19/25. Doing well. Has fu tomorrow     Mildly microcytic anemic - H/H - 13.1/39.3 - 10/24/23 resolved.  Ferritin was 10.  She is not on iron suppl at present. Will repeat iron studies.  She declines C-scope.  Cologuard - neg in 7/2022.  Iron studies have improved but she is still iron deficient.  She fu with GI, Dr. Cross, 9/26/22.  Pt deferred endoscopic eval.  If blood counts continue to be low she will call office and schedule procedures.   She does appear to be responding to the iron which is good.  She had one heavy cycle in May. No further episodes. She fu with OB- delgado neg.     GERD - Pt reports inc belching.  Resolved.  No longer on Nexium. She rarely takes Advil.      Hearing loss - Pt has home hearing aids.     HCM - Flu - none - refuses;  Tdap -  10/9/23;  PCV 13 - none;  PVX 23 - none; Shingrix - due;  COVID -19 Vaccine (#1) - 2/26/21;  # 2 3/19/21 ; MGM - '24 at [facility] - wnl - has order;   PAP - 2/28/25- neg.;  HIV Screen - none - refuses; C-scope - due at 50 y.o. - she declines; Cologuard - 7/14/22 - neg.; Ob/GYN - Dr. Almendarez; Ophtho - ? On Andreas; Cards - Dr. Rodriguez    Patient Care Team:  Jacqueline Serna MD as PCP - General (Internal Medicine)     Health Maintenance:  Immunization History   Administered Date(s) Administered    COVID-19, MRNA, LN-S, PF (Pfizer) (Purple Cap) 02/26/2021, 03/19/2021     Influenza - Trivalent - Afluria, Fluzone MDV 01/15/2014    Tdap 10/09/2023      Health Maintenance   Topic Date Due    Hepatitis C Screening  Never done    HIV Screening  Never done    Shingles Vaccine (1 of 2) Never done    Pneumococcal Vaccines (Age 50+) (1 of 1 - PCV) Never done    Influenza Vaccine (1) 09/01/2024    COVID-19 Vaccine (3 - 2024-25 season) 09/01/2024    Colorectal Cancer Screening  07/28/2025    Mammogram  08/29/2025    Lipid Panel  10/24/2028    Cervical Cancer Screening  02/28/2030    TETANUS VACCINE  10/09/2033    RSV Vaccine (Age 60+ and Pregnant patients) (1 - 1-dose 75+ series) 06/26/2045        Past Medical History:  Past Medical History:   Diagnosis Date    Corneal ulcer of left eye     GERD (gastroesophageal reflux disease)     Hearing impairment        Past Surgical History:   has a past surgical history that includes Vaginal delivery and Corneal transplant (02/19/2025).    Family History:  family history includes Arthritis in her mother; Coronary artery disease (age of onset: 67) in her mother; Coronary artery disease (age of onset: 70) in her father; Heart attack (age of onset: 70) in her father; Heart disease (age of onset: 55) in her brother.     Social History:  Social History[1]    Review of Systems   Constitutional:  Positive for night sweats. Negative for activity change, chills, diaphoresis, fever and unexpected weight change.   HENT:  Positive for hearing loss. Negative for rhinorrhea and trouble swallowing.    Eyes:  Positive for visual disturbance. Negative for discharge.   Respiratory:  Negative for cough, chest tightness, shortness of breath and wheezing.    Cardiovascular:  Negative for chest pain and palpitations.   Gastrointestinal:  Negative for blood in stool, constipation, diarrhea and vomiting.   Endocrine: Positive for heat intolerance. Negative for polydipsia and polyuria.   Genitourinary:  Negative for difficulty urinating, dysuria, hematuria and menstrual problem.    Musculoskeletal:  Negative for arthralgias, joint swelling and neck pain.   Neurological:  Negative for weakness and headaches.   Psychiatric/Behavioral:  Negative for confusion and dysphoric mood.         Medications:  Current Medications[2]     Allergies:  Review of patient's allergies indicates:  No Known Allergies    Physical Exam      Vital Signs  Temp: 98.2 °F (36.8 °C)  Temp Source: Oral  Pulse: 88  SpO2: 98 %  BP: 112/68  BP Location: Right arm  Patient Position: Sitting  Pain Score: 0-No pain  Height and Weight  Weight: 63.7 kg (140 lb 6.9 oz)      Patient Position: Sitting      Physical Exam  Vitals reviewed.   Constitutional:       General: She is not in acute distress.     Appearance: Normal appearance. She is not ill-appearing, toxic-appearing or diaphoretic.   HENT:      Head: Normocephalic and atraumatic.      Right Ear: Tympanic membrane normal.      Left Ear: Tympanic membrane normal.      Mouth/Throat:      Mouth: Mucous membranes are moist.   Eyes:      Conjunctiva/sclera: Conjunctivae normal.      Pupils: Pupils are equal, round, and reactive to light.      Comments: Left eye (Cornea transplant - see white ring)   Neck:      Vascular: No carotid bruit.   Cardiovascular:      Rate and Rhythm: Normal rate and regular rhythm.      Pulses: Normal pulses.      Heart sounds: Normal heart sounds. No murmur heard.  Pulmonary:      Effort: No respiratory distress.      Breath sounds: Normal breath sounds. No wheezing.   Abdominal:      General: Bowel sounds are normal. There is no distension.      Palpations: Abdomen is soft.      Tenderness: There is no abdominal tenderness. There is no guarding or rebound.   Skin:     General: Skin is warm.   Neurological:      General: No focal deficit present.      Mental Status: She is alert and oriented to person, place, and time.   Psychiatric:         Mood and Affect: Mood normal.         Behavior: Behavior normal.          Laboratory:  CBC:  Recent Labs   Lab  08/04/22  0800 09/19/22  0659 10/24/23  0648   WBC 5.8 4.1 4.3   RBC 4.50 4.90 4.64   Hemoglobin 10.8 L 12.5 13.1   Hematocrit 35.3 40.0 39.3   Platelets 306 259 282   MCV 78.4 L 81.6 84.7   MCH 24.0 L 25.5 L 28.2   MCHC 30.6 L 31.3 L 33.3       CMP:  Recent Labs   Lab 08/04/22  0800 10/24/23  0648   Glucose 89 95   Calcium 8.8 9.1   Albumin 3.8 3.9   Total Protein 7.1 6.4   Sodium 138 140   Potassium 4.5 3.9   CO2 23 28   Chloride 110 107   BUN 18 17   Creatinine 0.54 0.41 L   ALT 14 15   AST 18 15   Total Bilirubin 0.4 0.6         LIPIDS:  Recent Labs   Lab 08/04/22  0800 10/24/23  0648   TSH 1.09 1.79   HDL 66 72   Cholesterol 165 172   Triglycerides 51 66   LDL Cholesterol 86 85   HDL/Cholesterol Ratio 2.5 2.4   Non HDL Chol. (LDL+VLDL) 99 100       TSH:  Recent Labs   Lab 08/04/22  0800 10/24/23  0648   TSH 1.09 1.79          Other:   Recent Labs   Lab 10/24/23  0648   Ferritin 19   Iron 72   TIBC 372           Assessment/Plan     Carie Howell is a 54 y.o.female with:    Normal physical exam, routine  -     CBC Auto Differential; Future; Expected date: 03/07/2025  -     Comprehensive Metabolic Panel; Future; Expected date: 03/07/2025  -     TSH; Future; Expected date: 03/07/2025  - Performed today.  Will check Basic labs.  RTC in 1 yr for fu or sooner if needed    Screening for lipoid disorders  -     Lipid Panel; Future; Expected date: 03/07/2025    Screening for colorectal cancer  -     Ambulatory referral/consult to Endo Procedure ; Future; Expected date: 07/15/2025       Chronic conditions status updated as per HPI.  Other than changes above, cont current medications and maintain follow up with specialists.    Follow up in about 1 day (around 3/8/2025) for well visit or sooner if needed.      Jacuqeline Serna MD  [facility]                       [1]   Social History  Tobacco Use    Smoking status: Never     Passive exposure: Never    Smokeless tobacco: Never   Substance Use  Topics    Alcohol use: Yes     Comment: socially    Drug use: Never   [2]   Current Outpatient Medications:     ibuprofen (ADVIL,MOTRIN) 200 MG tablet, Take 2 tablets (400 mg total) by mouth every 8 (eight) hours as needed for Pain., Disp: 1 tablet, Rfl: 0

## 2025-03-07 ENCOUNTER — OFFICE VISIT (OUTPATIENT)
Dept: PRIMARY CARE CLINIC | Facility: CLINIC | Age: 55
End: 2025-03-07
Payer: COMMERCIAL

## 2025-03-07 ENCOUNTER — PATIENT OUTREACH (OUTPATIENT)
Dept: ADMINISTRATIVE | Facility: HOSPITAL | Age: 55
End: 2025-03-07
Payer: COMMERCIAL

## 2025-03-07 VITALS
BODY MASS INDEX: 23.37 KG/M2 | TEMPERATURE: 98 F | HEART RATE: 88 BPM | WEIGHT: 140.44 LBS | OXYGEN SATURATION: 98 % | SYSTOLIC BLOOD PRESSURE: 112 MMHG | DIASTOLIC BLOOD PRESSURE: 68 MMHG

## 2025-03-07 DIAGNOSIS — Z00.00 NORMAL PHYSICAL EXAM, ROUTINE: Primary | ICD-10-CM

## 2025-03-07 DIAGNOSIS — Z12.12 SCREENING FOR COLORECTAL CANCER: ICD-10-CM

## 2025-03-07 DIAGNOSIS — Z13.220 SCREENING FOR LIPOID DISORDERS: ICD-10-CM

## 2025-03-07 DIAGNOSIS — Z12.11 SCREENING FOR COLORECTAL CANCER: ICD-10-CM

## 2025-03-07 PROCEDURE — 99999 PR PBB SHADOW E&M-EST. PATIENT-LVL IV: CPT | Mod: PBBFAC,,, | Performed by: INTERNAL MEDICINE

## 2025-03-07 NOTE — PROGRESS NOTES
HM updated with mammogram and pap  , care everywhere, immunizations updated  Chart review complete  [facility] reviewed    Health Maintenance Due   Topic Date Due    Hepatitis C Screening  Never done    HIV Screening  Never done    Shingles Vaccine (1 of 2) Never done    Pneumococcal Vaccines (Age 50+) (1 of 1 - PCV) Never done    Influenza Vaccine (1) 09/01/2024    COVID-19 Vaccine (3 - 2024-25 season) 09/01/2024

## 2025-03-17 ENCOUNTER — RESULTS FOLLOW-UP (OUTPATIENT)
Dept: PRIMARY CARE CLINIC | Facility: CLINIC | Age: 55
End: 2025-03-17

## 2025-03-17 NOTE — PROGRESS NOTES
I sent pt a my chart message -  I reviewed your labs.  Your thyroid function was normal.   Your Cholesterol looked good.  Your kidney function and liver functions looked good.  It does appear you could increase your hydration. You are not anemic. No further recommendations at this time.  Dr. SAVAGE

## 2025-04-25 ENCOUNTER — OFFICE VISIT (OUTPATIENT)
Dept: PRIMARY CARE CLINIC | Facility: CLINIC | Age: 55
DRG: 683 | End: 2025-04-25
Payer: COMMERCIAL

## 2025-04-25 ENCOUNTER — TELEPHONE (OUTPATIENT)
Dept: PRIMARY CARE CLINIC | Facility: CLINIC | Age: 55
End: 2025-04-25
Payer: COMMERCIAL

## 2025-04-25 ENCOUNTER — HOSPITAL ENCOUNTER (OUTPATIENT)
Dept: RADIOLOGY | Facility: HOSPITAL | Age: 55
Discharge: HOME | DRG: 683 | End: 2025-04-25
Attending: INTERNAL MEDICINE
Payer: COMMERCIAL

## 2025-04-25 ENCOUNTER — HOSPITAL ENCOUNTER (INPATIENT)
Facility: HOSPITAL | Age: 55
LOS: 1 days | Discharge: HOME | DRG: 683 | End: 2025-04-27
Attending: EMERGENCY MEDICINE | Admitting: INTERNAL MEDICINE
Payer: COMMERCIAL

## 2025-04-25 VITALS
WEIGHT: 132.5 LBS | TEMPERATURE: 98 F | HEIGHT: 65 IN | RESPIRATION RATE: 17 BRPM | SYSTOLIC BLOOD PRESSURE: 110 MMHG | HEART RATE: 85 BPM | DIASTOLIC BLOOD PRESSURE: 60 MMHG | OXYGEN SATURATION: 98 % | BODY MASS INDEX: 22.08 KG/M2

## 2025-04-25 DIAGNOSIS — H44.19 FUNGAL ENDOPHTHALMITIS: ICD-10-CM

## 2025-04-25 DIAGNOSIS — R11.2 NAUSEA AND VOMITING, UNSPECIFIED VOMITING TYPE: ICD-10-CM

## 2025-04-25 DIAGNOSIS — N17.9 AKI (ACUTE KIDNEY INJURY): ICD-10-CM

## 2025-04-25 DIAGNOSIS — E86.1 HYPOTENSION DUE TO HYPOVOLEMIA: ICD-10-CM

## 2025-04-25 DIAGNOSIS — B49 FUNGAL ENDOPHTHALMITIS: ICD-10-CM

## 2025-04-25 DIAGNOSIS — Z01.818 PREOP EXAMINATION: Primary | ICD-10-CM

## 2025-04-25 DIAGNOSIS — E87.6 HYPOKALEMIA: Primary | ICD-10-CM

## 2025-04-25 DIAGNOSIS — R89.9 ABNORMAL LABORATORY TEST: ICD-10-CM

## 2025-04-25 DIAGNOSIS — R07.9 CHEST PAIN: ICD-10-CM

## 2025-04-25 DIAGNOSIS — R11.2 NAUSEA & VOMITING: ICD-10-CM

## 2025-04-25 LAB
ABSOLUTE EOSINOPHIL (OHS): 0.29 K/UL
ABSOLUTE MONOCYTE (OHS): 0.89 K/UL (ref 0.3–1)
ABSOLUTE NEUTROPHIL COUNT (OHS): 4.16 K/UL (ref 1.8–7.7)
ALBUMIN SERPL BCP-MCNC: 2.8 G/DL (ref 3.5–5.2)
ALBUMIN SERPL BCP-MCNC: 3.5 G/DL (ref 3.5–5.2)
ALP SERPL-CCNC: 119 UNIT/L (ref 40–150)
ALT SERPL W/O P-5'-P-CCNC: 14 UNIT/L (ref 10–44)
ANION GAP (OHS): 10 MMOL/L (ref 8–16)
ANION GAP (OHS): 13 MMOL/L (ref 8–16)
AST SERPL-CCNC: 14 UNIT/L (ref 11–45)
BACTERIA #/AREA URNS AUTO: ABNORMAL /HPF
BASOPHILS # BLD AUTO: 0.05 K/UL
BASOPHILS NFR BLD AUTO: 0.8 %
BILIRUB SERPL-MCNC: 0.2 MG/DL (ref 0.1–1)
BILIRUB UR QL STRIP.AUTO: NEGATIVE
BIPAP: 0
BUN SERPL-MCNC: 45 MG/DL (ref 6–20)
BUN SERPL-MCNC: 54 MG/DL (ref 6–20)
CALCIUM SERPL-MCNC: 8 MG/DL (ref 8.7–10.5)
CALCIUM SERPL-MCNC: 8.9 MG/DL (ref 8.7–10.5)
CHLORIDE SERPL-SCNC: 109 MMOL/L (ref 95–110)
CHLORIDE SERPL-SCNC: 116 MMOL/L (ref 95–110)
CLARITY UR: ABNORMAL
CO2 SERPL-SCNC: 18 MMOL/L (ref 23–29)
CO2 SERPL-SCNC: 20 MMOL/L (ref 23–29)
COLOR UR AUTO: YELLOW
CREAT SERPL-MCNC: 2.1 MG/DL (ref 0.5–1.4)
CREAT SERPL-MCNC: 2.6 MG/DL (ref 0.5–1.4)
ERYTHROCYTE [DISTWIDTH] IN BLOOD BY AUTOMATED COUNT: 16.4 % (ref 11.5–14.5)
FIO2: 21 %
GFR SERPLBLD CREATININE-BSD FMLA CKD-EPI: 21 ML/MIN/1.73/M2
GFR SERPLBLD CREATININE-BSD FMLA CKD-EPI: 28 ML/MIN/1.73/M2
GLUCOSE SERPL-MCNC: 104 MG/DL (ref 70–110)
GLUCOSE SERPL-MCNC: 126 MG/DL (ref 70–110)
GLUCOSE UR QL STRIP: NEGATIVE
HCT VFR BLD AUTO: 31.4 % (ref 37–48.5)
HCV AB SERPL QL IA: NORMAL
HGB BLD-MCNC: 11.7 GM/DL (ref 12–16)
HGB UR QL STRIP: ABNORMAL
HIV 1+2 AB+HIV1 P24 AG SERPL QL IA: NORMAL
HOLD SPECIMEN: NORMAL
HYALINE CASTS UR QL AUTO: 8 /LPF (ref 0–1)
IMM GRANULOCYTES # BLD AUTO: 0.02 K/UL (ref 0–0.04)
IMM GRANULOCYTES NFR BLD AUTO: 0.3 % (ref 0–0.5)
INFLUENZA A MOLECULAR (OHS): NEGATIVE
INFLUENZA B MOLECULAR (OHS): NEGATIVE
KETONES UR QL STRIP: NEGATIVE
LDH SERPL L TO P-CCNC: 0.4 MMOL/L (ref 0.5–2.2)
LEUKOCYTE ESTERASE UR QL STRIP: ABNORMAL
LYMPHOCYTES # BLD AUTO: 1.12 K/UL (ref 1–4.8)
MAGNESIUM SERPL-MCNC: 2.1 MG/DL (ref 1.6–2.6)
MCH RBC QN AUTO: 28.5 PG (ref 27–31)
MCHC RBC AUTO-ENTMCNC: 37.3 G/DL (ref 32–36)
MCV RBC AUTO: 76 FL (ref 82–98)
MICROSCOPIC COMMENT: ABNORMAL
NITRITE UR QL STRIP: NEGATIVE
NUCLEATED RBC (/100WBC) (OHS): 0 /100 WBC
OHS QRS DURATION: 82 MS
OHS QTC CALCULATION: 422 MS
PH UR STRIP: 6 [PH]
PHOSPHATE SERPL-MCNC: 4 MG/DL (ref 2.7–4.5)
PLATELET # BLD AUTO: 344 K/UL (ref 150–450)
PMV BLD AUTO: 9.5 FL (ref 9.2–12.9)
POC PERFORMED BY: ABNORMAL
POC TEMPERATURE: 37 C
POCT GLUCOSE: 104 MG/DL (ref 70–110)
POTASSIUM SERPL-SCNC: 2.4 MMOL/L (ref 3.5–5.1)
POTASSIUM SERPL-SCNC: 2.7 MMOL/L (ref 3.5–5.1)
PROT SERPL-MCNC: 7.1 GM/DL (ref 6–8.4)
PROT UR QL STRIP: ABNORMAL
RBC # BLD AUTO: 4.11 M/UL (ref 4–5.4)
RBC #/AREA URNS AUTO: 5 /HPF (ref 0–4)
RELATIVE EOSINOPHIL (OHS): 4.4 %
RELATIVE LYMPHOCYTE (OHS): 17.2 % (ref 18–48)
RELATIVE MONOCYTE (OHS): 13.6 % (ref 4–15)
RELATIVE NEUTROPHIL (OHS): 63.7 % (ref 38–73)
SARS-COV-2 RDRP RESP QL NAA+PROBE: NEGATIVE
SODIUM SERPL-SCNC: 142 MMOL/L (ref 136–145)
SODIUM SERPL-SCNC: 144 MMOL/L (ref 136–145)
SP GR UR STRIP: 1.01
SPECIMEN SOURCE: ABNORMAL
SQUAMOUS #/AREA URNS AUTO: 2 /HPF
UROBILINOGEN UR STRIP-ACNC: NEGATIVE EU/DL
WBC # BLD AUTO: 6.53 K/UL (ref 3.9–12.7)
WBC #/AREA URNS AUTO: 14 /HPF (ref 0–5)

## 2025-04-25 PROCEDURE — 87086 URINE CULTURE/COLONY COUNT: CPT | Performed by: STUDENT IN AN ORGANIZED HEALTH CARE EDUCATION/TRAINING PROGRAM

## 2025-04-25 PROCEDURE — 96375 TX/PRO/DX INJ NEW DRUG ADDON: CPT

## 2025-04-25 PROCEDURE — 96361 HYDRATE IV INFUSION ADD-ON: CPT

## 2025-04-25 PROCEDURE — 93010 ELECTROCARDIOGRAM REPORT: CPT | Mod: ,,, | Performed by: INTERNAL MEDICINE

## 2025-04-25 PROCEDURE — 96360 HYDRATION IV INFUSION INIT: CPT | Mod: 59

## 2025-04-25 PROCEDURE — G0378 HOSPITAL OBSERVATION PER HR: HCPCS

## 2025-04-25 PROCEDURE — 99285 EMERGENCY DEPT VISIT HI MDM: CPT | Mod: 25

## 2025-04-25 PROCEDURE — 74019 RADEX ABDOMEN 2 VIEWS: CPT | Mod: 26,,, | Performed by: RADIOLOGY

## 2025-04-25 PROCEDURE — 99214 OFFICE O/P EST MOD 30 MIN: CPT | Mod: S$GLB,,, | Performed by: INTERNAL MEDICINE

## 2025-04-25 PROCEDURE — 36415 COLL VENOUS BLD VENIPUNCTURE: CPT | Performed by: STUDENT IN AN ORGANIZED HEALTH CARE EDUCATION/TRAINING PROGRAM

## 2025-04-25 PROCEDURE — 25000003 PHARM REV CODE 250: Performed by: STUDENT IN AN ORGANIZED HEALTH CARE EDUCATION/TRAINING PROGRAM

## 2025-04-25 PROCEDURE — 86803 HEPATITIS C AB TEST: CPT | Performed by: PHYSICIAN ASSISTANT

## 2025-04-25 PROCEDURE — 85025 COMPLETE CBC W/AUTO DIFF WBC: CPT | Performed by: PHYSICIAN ASSISTANT

## 2025-04-25 PROCEDURE — 87389 HIV-1 AG W/HIV-1&-2 AB AG IA: CPT | Performed by: PHYSICIAN ASSISTANT

## 2025-04-25 PROCEDURE — 87502 INFLUENZA DNA AMP PROBE: CPT | Performed by: STUDENT IN AN ORGANIZED HEALTH CARE EDUCATION/TRAINING PROGRAM

## 2025-04-25 PROCEDURE — 99900035 HC TECH TIME PER 15 MIN (STAT)

## 2025-04-25 PROCEDURE — 96365 THER/PROPH/DIAG IV INF INIT: CPT

## 2025-04-25 PROCEDURE — 83735 ASSAY OF MAGNESIUM: CPT | Performed by: STUDENT IN AN ORGANIZED HEALTH CARE EDUCATION/TRAINING PROGRAM

## 2025-04-25 PROCEDURE — 81001 URINALYSIS AUTO W/SCOPE: CPT | Performed by: STUDENT IN AN ORGANIZED HEALTH CARE EDUCATION/TRAINING PROGRAM

## 2025-04-25 PROCEDURE — 83605 ASSAY OF LACTIC ACID: CPT

## 2025-04-25 PROCEDURE — 93005 ELECTROCARDIOGRAM TRACING: CPT

## 2025-04-25 PROCEDURE — 25000003 PHARM REV CODE 250: Performed by: EMERGENCY MEDICINE

## 2025-04-25 PROCEDURE — 93005 ELECTROCARDIOGRAM TRACING: CPT | Mod: S$GLB,,, | Performed by: INTERNAL MEDICINE

## 2025-04-25 PROCEDURE — 80069 RENAL FUNCTION PANEL: CPT | Performed by: STUDENT IN AN ORGANIZED HEALTH CARE EDUCATION/TRAINING PROGRAM

## 2025-04-25 PROCEDURE — U0002 COVID-19 LAB TEST NON-CDC: HCPCS | Performed by: STUDENT IN AN ORGANIZED HEALTH CARE EDUCATION/TRAINING PROGRAM

## 2025-04-25 PROCEDURE — 93010 ELECTROCARDIOGRAM REPORT: CPT | Mod: S$GLB,,, | Performed by: INTERNAL MEDICINE

## 2025-04-25 PROCEDURE — 82040 ASSAY OF SERUM ALBUMIN: CPT | Performed by: PHYSICIAN ASSISTANT

## 2025-04-25 PROCEDURE — 99999 PR PBB SHADOW E&M-EST. PATIENT-LVL IV: CPT | Mod: PBBFAC,,, | Performed by: INTERNAL MEDICINE

## 2025-04-25 PROCEDURE — 74019 RADEX ABDOMEN 2 VIEWS: CPT | Mod: TC

## 2025-04-25 PROCEDURE — 63600175 PHARM REV CODE 636 W HCPCS: Performed by: STUDENT IN AN ORGANIZED HEALTH CARE EDUCATION/TRAINING PROGRAM

## 2025-04-25 PROCEDURE — 96366 THER/PROPH/DIAG IV INF ADDON: CPT

## 2025-04-25 RX ORDER — IBUPROFEN 200 MG
16 TABLET ORAL
Status: DISCONTINUED | OUTPATIENT
Start: 2025-04-25 | End: 2025-04-27 | Stop reason: HOSPADM

## 2025-04-25 RX ORDER — TALC
6 POWDER (GRAM) TOPICAL NIGHTLY PRN
Status: DISCONTINUED | OUTPATIENT
Start: 2025-04-25 | End: 2025-04-27 | Stop reason: HOSPADM

## 2025-04-25 RX ORDER — SODIUM CHLORIDE 9 MG/ML
1000 INJECTION, SOLUTION INTRAVENOUS
Status: COMPLETED | OUTPATIENT
Start: 2025-04-25 | End: 2025-04-25

## 2025-04-25 RX ORDER — NALOXONE HCL 0.4 MG/ML
0.02 VIAL (ML) INJECTION
Status: DISCONTINUED | OUTPATIENT
Start: 2025-04-25 | End: 2025-04-27 | Stop reason: HOSPADM

## 2025-04-25 RX ORDER — SODIUM CHLORIDE 0.9 % (FLUSH) 0.9 %
10 SYRINGE (ML) INJECTION EVERY 12 HOURS PRN
Status: DISCONTINUED | OUTPATIENT
Start: 2025-04-25 | End: 2025-04-27 | Stop reason: HOSPADM

## 2025-04-25 RX ORDER — POTASSIUM CHLORIDE 7.45 MG/ML
10 INJECTION INTRAVENOUS
Status: COMPLETED | OUTPATIENT
Start: 2025-04-25 | End: 2025-04-25

## 2025-04-25 RX ORDER — FAMOTIDINE 10 MG/ML
20 INJECTION, SOLUTION INTRAVENOUS
Status: COMPLETED | OUTPATIENT
Start: 2025-04-25 | End: 2025-04-25

## 2025-04-25 RX ORDER — IBUPROFEN 200 MG
24 TABLET ORAL
Status: DISCONTINUED | OUTPATIENT
Start: 2025-04-25 | End: 2025-04-27 | Stop reason: HOSPADM

## 2025-04-25 RX ORDER — ONDANSETRON 8 MG/1
8 TABLET, ORALLY DISINTEGRATING ORAL EVERY 8 HOURS PRN
Status: DISCONTINUED | OUTPATIENT
Start: 2025-04-25 | End: 2025-04-27 | Stop reason: HOSPADM

## 2025-04-25 RX ORDER — DROPERIDOL 2.5 MG/ML
1.25 INJECTION, SOLUTION INTRAMUSCULAR; INTRAVENOUS
Status: COMPLETED | OUTPATIENT
Start: 2025-04-25 | End: 2025-04-25

## 2025-04-25 RX ORDER — ONDANSETRON 8 MG/1
8 TABLET, ORALLY DISINTEGRATING ORAL EVERY 8 HOURS PRN
Qty: 30 TABLET | Refills: 1 | Status: SHIPPED | OUTPATIENT
Start: 2025-04-25

## 2025-04-25 RX ORDER — POTASSIUM CHLORIDE 7.45 MG/ML
10 INJECTION INTRAVENOUS
Status: COMPLETED | OUTPATIENT
Start: 2025-04-26 | End: 2025-04-26

## 2025-04-25 RX ORDER — GLUCAGON 1 MG
1 KIT INJECTION
Status: DISCONTINUED | OUTPATIENT
Start: 2025-04-25 | End: 2025-04-27 | Stop reason: HOSPADM

## 2025-04-25 RX ORDER — OMEPRAZOLE 20 MG/1
CAPSULE, DELAYED RELEASE ORAL
Qty: 30 CAPSULE | Refills: 0 | Status: SHIPPED | OUTPATIENT
Start: 2025-04-25

## 2025-04-25 RX ADMIN — SODIUM CHLORIDE 1000 ML: 9 INJECTION, SOLUTION INTRAVENOUS at 07:04

## 2025-04-25 RX ADMIN — DROPERIDOL 1.25 MG: 2.5 INJECTION, SOLUTION INTRAMUSCULAR; INTRAVENOUS at 06:04

## 2025-04-25 RX ADMIN — ONDANSETRON 8 MG: 8 TABLET, ORALLY DISINTEGRATING ORAL at 10:04

## 2025-04-25 RX ADMIN — POTASSIUM CHLORIDE 10 MEQ: 7.46 INJECTION, SOLUTION INTRAVENOUS at 06:04

## 2025-04-25 RX ADMIN — POTASSIUM BICARBONATE 50 MEQ: 978 TABLET, EFFERVESCENT ORAL at 06:04

## 2025-04-25 RX ADMIN — POTASSIUM CHLORIDE 10 MEQ: 7.46 INJECTION, SOLUTION INTRAVENOUS at 08:04

## 2025-04-25 RX ADMIN — FAMOTIDINE 20 MG: 10 INJECTION, SOLUTION INTRAVENOUS at 06:04

## 2025-04-25 RX ADMIN — SODIUM CHLORIDE, POTASSIUM CHLORIDE, SODIUM LACTATE AND CALCIUM CHLORIDE 1000 ML: 600; 310; 30; 20 INJECTION, SOLUTION INTRAVENOUS at 06:04

## 2025-04-25 NOTE — FIRST PROVIDER EVALUATION
" Emergency Department TeleTriage Encounter Note      CHIEF COMPLAINT    Chief Complaint   Patient presents with    Abnormal Lab     Low postassium, amphotericin stopped yest for fungal infection L eye       VITAL SIGNS   Initial Vitals [04/25/25 1633]   BP Pulse Resp Temp SpO2   (!) 109/58 77 18 97.4 °F (36.3 °C) 97 %      MAP       --            ALLERGIES    Review of patient's allergies indicates:  No Known Allergies    PROVIDER TRIAGE NOTE  Patient has had vomiting and cannot tolerate po. Today potassium is 2.7. She has been on amphotericin for fungal infection to eye after transplant.       ORDERS  Labs Reviewed   HEPATITIS C ANTIBODY   HEP C VIRUS HOLD SPECIMEN   HIV 1 / 2 ANTIBODY       ED Orders (720h ago, onward)      Start Ordered     Status Ordering Provider    04/25/25 1637 04/25/25 1636  EKG 12-lead  Once         Completed by STEVE CHAVEZ on 4/25/2025 at  4:46 PM KTAHY, YADIRA HUIZAR    04/25/25 1636 04/25/25 1635  Hepatitis C Antibody  STAT        Placed in "And" Linked Group    Ordered NATHAN PROCTOR    04/25/25 1636 04/25/25 1635  HCV Virus Hold Specimen  STAT        Placed in "And" Linked Group    Ordered NATHAN PROCTOR    04/25/25 1636 04/25/25 1635  HIV 1/2 Ag/Ab (4th Gen)  STAT         Ordered NATHAN PROCOTR              Virtual Visit Note: The provider triage portion of this emergency department evaluation and documentation was performed via [employer], a HIPAA-compliant telemedicine application, in concert with a tele-presenter in the room. A face to face patient evaluation with one of my colleagues will occur once the patient is placed in an emergency department room.      DISCLAIMER: This note was prepared with [employer]*Jumptap voice recognition transcription software. Garbled syntax, mangled pronouns, and other bizarre constructions may be attributed to that software system.    "

## 2025-04-25 NOTE — ED TRIAGE NOTES
Pt c/o vomiting since Saturday r/t Amphoterecin infusion she was getting.  Meds were stopped 2 days ago.  Pt is scheduled for surgery on her left eye on Wednesday.  Pt is here for dehydration.  Pt continues to vomit but episodes are inproving.

## 2025-04-25 NOTE — ED PROVIDER NOTES
Encounter Date: 4/25/2025       History     Chief Complaint   Patient presents with    Abnormal Lab     Low postassium, amphotericin stopped yest for fungal infection L eye     HPI    54-year-old female significant medical history of iron-deficiency anemia, GERD, hearing impairment, presenting for hypokalemia.    Patient reports since her amphotericin B dose was doubled she has had numerous episodes of nonbloody nonbilious emesis for the last week.  She has been unable to tolerate p.o. intake for that entire time as well.  She denies fever, chills, abdominal pain, chest pain, cough, shortness of breath, diarrhea, dysuria.  She has since discontinued the amphotericin B, her PICC line was recently removed.    On chart review, patient was seen by primary care earlier today for a pre-surgical evaluation.  She was previously treated for endophthalmitis, planned for repeat corneal transplant under general anesthesia.  Potassium of 2.7 with JOSE with creatinine of 2.6.    Review of patient's allergies indicates:  No Known Allergies  Past Medical History:   Diagnosis Date    Corneal ulcer of left eye     GERD (gastroesophageal reflux disease)     Hearing impairment      Past Surgical History:   Procedure Laterality Date    CORNEAL TRANSPLANT  02/19/2025    VAGINAL DELIVERY      x 2     Family History   Problem Relation Name Age of Onset    Coronary artery disease Mother Imelda Kat 67    Arthritis Mother Imelda Kat     Coronary artery disease Father  70    Heart attack Father  70    Heart disease Brother Parish Tangr 55        s/p PCI     Social History[1]  Review of Systems  See HPI for pertinent ROS.   Physical Exam     Initial Vitals [04/25/25 1633]   BP Pulse Resp Temp SpO2   (!) 109/58 77 18 97.4 °F (36.3 °C) 97 %      MAP       --         Physical Exam    Constitutional: She appears well-developed and well-nourished.   HENT:   Head: Normocephalic and atraumatic.   Dry mucous membranes.  Clouded cornea on the left  nonresponsive pupil   Eyes: Conjunctivae are normal. No scleral icterus.   Neck: No JVD present.   Cardiovascular:  Normal rate, regular rhythm and normal heart sounds.     Exam reveals no gallop and no friction rub.       No murmur heard.  Pulmonary/Chest: Breath sounds normal. No stridor. She has no wheezes. She has no rhonchi. She has no rales.   Abdominal: Abdomen is soft. There is no abdominal tenderness. There is no rebound and no guarding.   Musculoskeletal:         General: No tenderness or edema.     Neurological: She is alert.   Skin: Skin is warm. Capillary refill takes less than 2 seconds.   Psychiatric: She has a normal mood and affect.         ED Course   Procedures  Labs Reviewed   COMPREHENSIVE METABOLIC PANEL - Abnormal       Result Value    Sodium 142      Potassium 2.4 (*)     Chloride 109      CO2 20 (*)     Glucose 126 (*)     BUN 54 (*)     Creatinine 2.6 (*)     Calcium 8.9      Protein Total 7.1      Albumin 3.5      Bilirubin Total 0.2            AST 14      ALT 14      Anion Gap 13      eGFR 21 (*)    CBC WITH DIFFERENTIAL - Abnormal    WBC 6.53      RBC 4.11      HGB 11.7 (*)     HCT 31.4 (*)     MCV 76 (*)     MCH 28.5      MCHC 37.3 (*)     RDW 16.4 (*)     Platelet Count 344      MPV 9.5      Nucleated RBC 0      Neut % 63.7      Lymph % 17.2 (*)     Mono % 13.6      Eos % 4.4      Basophil % 0.8      Imm Grans % 0.3      Neut # 4.16      Lymph # 1.12      Mono # 0.89      Eos # 0.29      Baso # 0.05      Imm Grans # 0.02     INFLUENZA A & B BY MOLECULAR - Normal    INFLUENZA A MOLECULAR Negative      INFLUENZA B MOLECULAR  Negative     HEPATITIS C ANTIBODY - Normal    Hep C Ab Interp Non-Reactive     HIV 1 / 2 ANTIBODY - Normal    HIV 1/2 Ag/Ab Non-Reactive     MAGNESIUM - Normal    Magnesium  2.1     SARS-COV-2 RNA AMPLIFICATION, QUAL - Normal    SARS COV-2 Molecular Negative     CBC W/ AUTO DIFFERENTIAL    Narrative:     The following orders were created for panel order CBC  auto differential.  Procedure                               Abnormality         Status                     ---------                               -----------         ------                     CBC with Differential[[id number]]       Abnormal            Final result                 Please view results for these tests on the individual orders.   HEP C VIRUS HOLD SPECIMEN   URINALYSIS, REFLEX TO URINE CULTURE   GREY TOP URINE HOLD          Imaging Results              X-Ray Chest 1 View (Final result)  Result time 04/25/25 19:36:34      Final result by Jb Johnston MD (04/25/25 19:36:34)                   Impression:      1. Pulmonary findings may reflect edema, no large focal consolidation.      Electronically signed by: Jb Johnston MD  Date:    04/25/2025  Time:    19:36               Narrative:    EXAMINATION:  XR CHEST 1 VIEW    CLINICAL HISTORY:  Nausea with vomiting, unspecified    TECHNIQUE:  Single frontal view of the chest was performed.    COMPARISON:  None    FINDINGS:  The cardiomediastinal silhouette is prominent, magnified by technique..  There is no pleural effusion.  The trachea is midline.  The lungs are symmetrically expanded bilaterally with coarse interstitial attenuation.  No large focal consolidation seen.  There is no pneumothorax.  The osseous structures are remarkable for degenerative change..                                       Medications   potassium chloride 10 mEq in 100 mL IVPB (10 mEq Intravenous New Bag 4/25/25 2005)   lactated ringers bolus 1,000 mL (0 mLs Intravenous Stopped 4/25/25 2030)   droPERidol injection 1.25 mg (1.25 mg Intravenous Given 4/25/25 1846)   famotidine (PF) injection 20 mg (20 mg Intravenous Given 4/25/25 1846)   potassium chloride 10 mEq in 100 mL IVPB (0 mEq Intravenous Stopped 4/25/25 1954)   potassium bicarbonate disintegrating tablet 50 mEq (50 mEq Oral Given 4/25/25 1846)   0.9% NaCl infusion (1,000 mLs Intravenous New Bag 4/25/25 1933)   sodium  chloride 0.9% bolus 1,000 mL 1,000 mL (1,000 mLs Intravenous Bolus from Bag 4/25/25 1920)     Medical Decision Making  Amount and/or Complexity of Data Reviewed  Radiology: ordered.    Risk  Prescription drug management.                                  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  She has had 1 week of nausea and vomiting with decreased oral intake.  On arrival, vitals are stable with intermittent hypotension.  Physical exam overall reassuring.  Nausea and vomiting likely medication side effect to amphotericin B increased dose.  Sepsis less likely given no leukocytosis, no elevated lactate.  Labs notable for potassium of 2.4, no hypomagnesemia.  Severe JOSE with creatinine of 2.6, GFR of 21.  Patient was given 1 L of LR, 1 L of NS, 50 mEq of oral potassium, 2 doses of 10 mEq of potassium IV, and IV droperidol for nausea.    ED would like to admit for hypokalemia with JOSE.  Discussed patient with Hospital Medicine who accepted patient for admission.      Clinical Impression:  Final diagnoses:  [R89.9] Abnormal laboratory test  [R11.2] Nausea & vomiting  [E87.6] Hypokalemia (Primary)  [N17.9] JOSE (acute kidney injury)  [E86.1] Hypotension due to hypovolemia          ED Disposition Condition    Observation                     [1]   Social History  Tobacco Use    Smoking status: Never     Passive exposure: Never    Smokeless tobacco: Never   Substance Use Topics    Alcohol use: Yes     Comment: socially    Drug use: Never        Rosa Vu MD  Resident  04/25/25 2037

## 2025-04-25 NOTE — ED NOTES
Patient identifiers verified and correct for Carie Howell  LOC: The patient is awake, alert and aware of environment with an appropriate affect, the patient is oriented x 3 and speaking appropriately.   APPEARANCE: Patient appears comfortable and in no acute distress, patient is clean and well groomed.  SKIN: The skin is warm and dry, color consistent with ethnicity, patient has normal skin turgor and dry mucus membranes, skin intact, no breakdown or bruising noted.   MUSCULOSKELETAL: Patient moving all extremities spontaneously, no swelling noted.  RESPIRATORY: Airway is open and patent, respirations are spontaneous, patient has a normal effort and rate, no accessory muscle.  CARDIAC: Pt placed on cardiac monitor. Patient has a normal rate and regular rhythm, no edema noted, capillary refill < 3 seconds.   GASTRO: Soft and non tender to palpation, no distention noted.  : Pt denies any pain or frequency with urination.

## 2025-04-25 NOTE — TELEPHONE ENCOUNTER
Call received from Al in the chemistry lab to report a critical lab value of K+ level of 2.7 on this patient.    Spoke with Dr. Serna who advised that patient needs to go to the ED to get IV potassium since she was vomiting so much and she does not feel that patient will tolerate the pills.    Called patient and spoke with her .  He asked if they can just go to one of those clinics that administer IV's.  I advised against that and instructed him to go to an [facility] ED because they have access to Mrs. Darnell's chart and they can see her lab values.  Verbalizes understanding and states that he will take her to the ED.

## 2025-04-25 NOTE — PROGRESS NOTES
[facility] Note    Chief Complaint      Chief Complaint   Patient presents with    Pre-op Exam       History of Present Illness      Carie Howell is a 54 y.o.  WF presents to fu well visit . Last visit - 3/7/25    Preop exam- Pt is planning for enucleation next wk.   She has had to have a pars plana vitrectomy for biopsy and also a repeat corneal transplant which was done under general anesthesia.   Hosp - 3/19/25 - Left endophthalmitis PPV/Vitreous biopsy/Anterior chamber paracentesis with diagnostic sample/Anterior chamber washout/Intracameral injections (antibiotics/antifungals). Failed voriconazole. She did not mathues the Amphotericin which has caused Nausea and emesis.  Pt has vomited during visit.  She had rec gatorade this AM.  She will avoid red  drinks for now.  Non-bilious, no coffee ground emesis. Will monitor closely.  We discussed signs of dehydration for which to go to the ED. I will check a CBC and CMP. Will check and EKG.  She had a recent CXR - 4/10/25 - Prominence of the interstitial lung markings could represent underlying interstitial lung disease, pulmonary edema, atypical infection, or emphysematous changes or combination of these etiologies depending on clinical context. Fu by ID, Dr. Al.      UC - 1/18/24 - Left corneal abrasion  UC - 10/25/24 - Mucopurulent conjunctivitis Left eye - tx with ofloxacin.   UC - 11/17/24 - Left pink eye - Tx with ofloxacin  UC - 12/21/24 - Left eye foreign body - Tx with erythromycin ointment  ED - 1/9/25  - Corneal ulcer - Tx with Valtrex  Pt saw Dr. Rivera at [facility] and was given a corneal transplant 2/19/25.       Mildly microcytic anemic - H/H - 13.1/39.3 - 10/24/23 resolved.  Ferritin was 10.  She is not on iron suppl at present. Will repeat iron studies.  She declines C-scope.  Cologuard - neg in 7/2022.  Iron studies have improved but she is still iron deficient.  She fu with [facility], Dr. Cross, 9/26/22.  Pt deferred  endoscopic eval.  If blood counts continue to be low she will call office and schedule procedures.   She does appear to be responding to the iron which is good.  She had one heavy cycle in May. No further episodes. She fu with OB- delgado neg.     GERD - Pt reports inc belching.  Resolved.  No longer on Nexium. She rarely takes Advil.      Hearing loss - Pt has home hearing aids.     HCM - Flu - none - refuses;  Tdap -  10/9/23;  PCV 13 - none;  PVX 23 - none; Shingrix - due;  COVID -19 Vaccine (#1) - 2/26/21;  # 2 3/19/21 ; MGM - '24 at [facility] - wnl - has order;   PAP - 2/28/25- neg.;  HIV Screen - none - refuses; C-scope - due at 50 y.o. - she declines; Cologuard - 7/14/22 - neg.; Ob/GYN - Dr. Almendarez; Ophtho - ? On [place]; Cards - Dr. Rodriguez      Patient Care Team:  Jacqueline Serna MD as PCP - General (Internal Medicine)     Health Maintenance:  Immunization History   Administered Date(s) Administered    COVID-19, MRNA, LN-S, PF (Pfizer) (Purple Cap) 02/26/2021, 03/19/2021    Influenza - Trivalent - Afluria, Fluzone MDV 01/15/2014    Tdap 10/09/2023      Health Maintenance   Topic Date Due    Shingles Vaccine (1 of 2) Never done    Pneumococcal Vaccines (Age 50+) (1 of 1 - PCV) Never done    Influenza Vaccine (1) 09/01/2024    COVID-19 Vaccine (3 - 2024-25 season) 09/01/2024    Colorectal Cancer Screening  07/28/2025    Mammogram  08/29/2025    Cervical Cancer Screening  02/28/2030    Lipid Panel  03/15/2030    TETANUS VACCINE  10/09/2033    RSV Vaccine (Age 60+ and Pregnant patients) (1 - 1-dose 75+ series) 06/26/2045    Hepatitis C Screening  Completed    HIV Screening  Completed        Past Medical History:  Past Medical History:   Diagnosis Date    Corneal ulcer of left eye     GERD (gastroesophageal reflux disease)     Hearing impairment        Past Surgical History:   has a past surgical history that includes Vaginal delivery and Corneal transplant (02/19/2025).    Family History:  family history includes  "Arthritis in her mother; Coronary artery disease (age of onset: 67) in her mother; Coronary artery disease (age of onset: 70) in her father; Heart attack (age of onset: 70) in her father; Heart disease (age of onset: 55) in her brother.     Social History:  Social History[1]    Review of Systems   Constitutional:  Positive for chills and fatigue. Negative for fever and night sweats.   HENT:  Positive for hearing loss.         Dry mouth   Eyes:  Positive for pain, discharge, redness and visual disturbance.   Respiratory:  Negative for cough, chest tightness and wheezing.    Cardiovascular:  Negative for chest pain, palpitations and leg swelling.   Gastrointestinal:  Positive for constipation, nausea and vomiting. Negative for abdominal pain, blood in stool, diarrhea and reflux.   Endocrine: Negative for cold intolerance, heat intolerance and polydipsia.   Genitourinary:  Negative for bladder incontinence, difficulty urinating, frequency and hematuria.   Musculoskeletal:  Negative for arthralgias and myalgias.   Neurological:  Negative for dizziness.   Psychiatric/Behavioral:  Negative for depressed mood. The patient is not nervous/anxious.         Medications:  Current Medications[2]     Allergies:  Review of patient's allergies indicates:  No Known Allergies    Physical Exam      Vital Signs  Temp: 98.1 °F (36.7 °C)  Temp Source: Oral  Pulse: 85  Resp: 17  SpO2: 98 %  BP: 110/60  BP Location: Right arm  Patient Position: Sitting  Pain Score: 0-No pain  Height and Weight  Height: 5' 5" (165.1 cm)  Weight: 60.1 kg (132 lb 7.9 oz)  BSA (Calculated - sq m): 1.66 sq meters  BMI (Calculated): 22  Weight in (lb) to have BMI = 25: 149.9      Patient Position: Sitting      Physical Exam  Vitals reviewed.   Constitutional:       Appearance: Normal appearance. She is ill-appearing.      Comments: Pt vomited red watery emesis. No coffee grounds.  Looks like red gatorade.    HENT:      Right Ear: Tympanic membrane normal.      " Left Ear: Tympanic membrane normal.      Mouth/Throat:      Mouth: Mucous membranes are dry.   Eyes:      Comments: Left eye opacified;  scleral injection to Left eye;  yellow discharge to Left eye medially.    Neck:      Vascular: No carotid bruit.   Cardiovascular:      Rate and Rhythm: Normal rate and regular rhythm.      Pulses: Normal pulses.      Heart sounds: Normal heart sounds. No murmur heard.  Pulmonary:      Effort: No respiratory distress.      Breath sounds: Normal breath sounds. No wheezing.   Abdominal:      General: There is no distension.      Palpations: Abdomen is soft. There is no mass.      Tenderness: There is no abdominal tenderness. There is no guarding or rebound.   Musculoskeletal:         General: Normal range of motion.   Neurological:      General: No focal deficit present.      Mental Status: She is alert and oriented to person, place, and time.   Psychiatric:         Mood and Affect: Mood normal.         Behavior: Behavior normal.          Laboratory:  CBC:  Recent Labs   Lab 04/25/25  1713 04/26/25  0549 04/27/25  0400   WBC 6.53 5.85 6.21   RBC 4.11 3.65 L 3.74 L   HGB 11.7 L 10.7 L 10.6 L   HCT 31.4 L 28.9 L 29.0 L   Platelet Count 344 291 280   MCV 76 L 79 L 78 L   MCH 28.5 29.3 28.3   MCHC 37.3 H 37.0 H 36.6 H       CMP:  Recent Labs   Lab 03/15/25  0844 04/08/25  1957 04/10/25  0805 04/25/25  1044 04/25/25  1713 04/25/25  2221 04/27/25  0400 04/27/25  1144   Glucose 86  --   --   --   --   --   --   --    Calcium 9.0 8.6   < > 8.9 8.9   < > 8.5 L 8.5 L   Albumin 3.7 3.6   < > 3.4 L 3.5   < > 3.0 L  --    Total Protein 6.2  --   --   --   --   --   --   --    Sodium 140 135   < > 143 142   < > 147 H 146 H   Potassium 4.2 4.0   < > 2.7 LL 2.4 LL   < > 2.8 L 3.3 L   CO2 28  --    < > 19 L 20 L   < > 25 28   Carbon Dioxide  --  21 L   < >  --   --   --   --   --    Chloride 107  --    < > 111 H 109   < > 112 H 107   BUN 17 22.0   < > 53 H 54 H   < > 33 H 34 H   Creatinine 0.49 L  0.49 L   < > 2.6 H 2.6 H   < > 1.4 1.4   ALP  --   --    < > 117 119  --   --   --    ALT 13 13   < > 14 14  --   --   --    AST 17 13   < > 17 14  --   --   --    Total Bilirubin 0.6 0.4  --   --   --   --   --   --    Bilirubin Total  --   --   --  0.2 0.2  --   --   --     < > = values in this interval not displayed.         LIPIDS:  Recent Labs   Lab 08/04/22  0800 10/24/23  0648 03/15/25  0844   TSH 1.09 1.79 1.02   HDL 66 72 70   Cholesterol 165 172 186   Triglycerides 51 66 52   LDL Cholesterol 86 85 103 H   HDL/Cholesterol Ratio 2.5 2.4 2.7   Non HDL Chol. (LDL+VLDL) 99 100 116       TSH:  Recent Labs   Lab 08/04/22  0800 10/24/23  0648 03/15/25  0844   TSH 1.09 1.79 1.02         Other:   Recent Labs   Lab 10/24/23  0648   Ferritin 19   Iron 72   TIBC 372     Recent Labs   Lab 04/25/25  1713   Hep C Ab Interp Non-Reactive       Assessment/Plan     Carie Howell is a 54 y.o.female with:    Preop examination  -     Comprehensive Metabolic Panel; Future; Expected date: 04/25/2025  -     CBC Auto Differential; Future; Expected date: 04/25/2025  -     IN OFFICE EKG 12-LEAD (to Muse)  - Will check EKG, CMP, CBC.  Monitor for dehydration. Enc inc protein in diet.  Will Refill zofran.  Will give 2-4 wk trial of Prilosec.  D/w pt Sx's for which to go to the ED incl continued emesi    Nausea and vomiting, unspecified vomiting type  -     ondansetron (ZOFRAN-ODT) 8 MG TbDL; Take 1 tablet (8 mg total) by mouth every 8 (eight) hours as needed.  Dispense: 30 tablet; Refill: 1  -     omeprazole (PRILOSEC) 20 MG capsule; 1 po daily x 2-4 wks  Dispense: 30 capsule; Refill: 0  -     X-Ray Abdomen Flat And Erect; Future; Expected date: 04/25/2025  -  Will check  CMP, CBC.  Monitor for dehydration. D/w pt Sx's for which to go to the ED incl continued emesis.   Enc inc protein in diet.  Will Refill zofran.  Will give 2-4 wk trial of Prilosec. Avoid Red drinks.     Fungal endophthalmitis  - Pt planning for enucleation  next wk with washout. Fu by ID, Dr. Al. Will check EKG, CMP, CBC and clear if ok. Potassium was low and cr inc so sent to ED for IVF and Kcl.        Chronic conditions status updated as per HPI.  Other than changes above, cont current medications and maintain follow up with specialists.   Follow up in about 6 weeks (around 6/6/2025).      Jacqueline Serna MD  [facility]                       [1]   Social History  Tobacco Use    Smoking status: Never     Passive exposure: Never    Smokeless tobacco: Never   Substance Use Topics    Alcohol use: Yes     Comment: socially    Drug use: Never   [2]   Current Outpatient Medications:     ibuprofen (ADVIL,MOTRIN) 200 MG tablet, Take 2 tablets (400 mg total) by mouth every 8 (eight) hours as needed for Pain., Disp: 1 tablet, Rfl: 0    natamycin (NATACYN) 5 % ophthalmic solution, Instill 1 drop into the left eye every hour around the clock, Disp: 15 mL, Rfl: 3    omeprazole (PRILOSEC) 20 MG capsule, 1 po daily x 2-4 wks, Disp: 30 capsule, Rfl: 0    ondansetron (ZOFRAN-ODT) 8 MG TbDL, Take 1 tablet (8 mg total) by mouth every 8 (eight) hours as needed., Disp: 30 tablet, Rfl: 1    potassium chloride SA (K-DUR,KLOR-CON) 20 MEQ tablet, Take 1 tablet (20 mEq total) by mouth 2 (two) times daily., Disp: 60 tablet, Rfl: 2  No current facility-administered medications for this visit.

## 2025-04-26 LAB
ABSOLUTE EOSINOPHIL (OHS): 0.3 K/UL
ABSOLUTE MONOCYTE (OHS): 1 K/UL (ref 0.3–1)
ABSOLUTE NEUTROPHIL COUNT (OHS): 3.41 K/UL (ref 1.8–7.7)
ALBUMIN SERPL BCP-MCNC: 3 G/DL (ref 3.5–5.2)
ANION GAP (OHS): 11 MMOL/L (ref 8–16)
ANION GAP (OHS): 9 MMOL/L (ref 8–16)
BASOPHILS # BLD AUTO: 0.07 K/UL
BASOPHILS NFR BLD AUTO: 1.2 %
BUN SERPL-MCNC: 36 MG/DL (ref 6–20)
BUN SERPL-MCNC: 37 MG/DL (ref 6–20)
CALCIUM SERPL-MCNC: 8.2 MG/DL (ref 8.7–10.5)
CALCIUM SERPL-MCNC: 8.5 MG/DL (ref 8.7–10.5)
CHLORIDE SERPL-SCNC: 113 MMOL/L (ref 95–110)
CHLORIDE SERPL-SCNC: 116 MMOL/L (ref 95–110)
CO2 SERPL-SCNC: 18 MMOL/L (ref 23–29)
CO2 SERPL-SCNC: 22 MMOL/L (ref 23–29)
CREAT SERPL-MCNC: 1.7 MG/DL (ref 0.5–1.4)
CREAT SERPL-MCNC: 1.7 MG/DL (ref 0.5–1.4)
CREAT UR-MCNC: 18 MG/DL (ref 15–325)
CREAT UR-MCNC: 18 MG/DL (ref 15–325)
ERYTHROCYTE [DISTWIDTH] IN BLOOD BY AUTOMATED COUNT: 16.8 % (ref 11.5–14.5)
GFR SERPLBLD CREATININE-BSD FMLA CKD-EPI: 35 ML/MIN/1.73/M2
GFR SERPLBLD CREATININE-BSD FMLA CKD-EPI: 35 ML/MIN/1.73/M2
GLUCOSE SERPL-MCNC: 113 MG/DL (ref 70–110)
GLUCOSE SERPL-MCNC: 153 MG/DL (ref 70–110)
HCT VFR BLD AUTO: 28.9 % (ref 37–48.5)
HGB BLD-MCNC: 10.7 GM/DL (ref 12–16)
IMM GRANULOCYTES # BLD AUTO: 0.02 K/UL (ref 0–0.04)
IMM GRANULOCYTES NFR BLD AUTO: 0.3 % (ref 0–0.5)
LYMPHOCYTES # BLD AUTO: 1.05 K/UL (ref 1–4.8)
MAGNESIUM SERPL-MCNC: 1.7 MG/DL (ref 1.6–2.6)
MAGNESIUM SERPL-MCNC: 1.8 MG/DL (ref 1.6–2.6)
MCH RBC QN AUTO: 29.3 PG (ref 27–31)
MCHC RBC AUTO-ENTMCNC: 37 G/DL (ref 32–36)
MCV RBC AUTO: 79 FL (ref 82–98)
NUCLEATED RBC (/100WBC) (OHS): 0 /100 WBC
OHS QRS DURATION: 78 MS
OHS QTC CALCULATION: 386 MS
PHOSPHATE SERPL-MCNC: 3.4 MG/DL (ref 2.7–4.5)
PLATELET # BLD AUTO: 291 K/UL (ref 150–450)
PMV BLD AUTO: 9.3 FL (ref 9.2–12.9)
POTASSIUM SERPL-SCNC: 2.4 MMOL/L (ref 3.5–5.1)
POTASSIUM SERPL-SCNC: 2.8 MMOL/L (ref 3.5–5.1)
POTASSIUM UR-SCNC: 11 MMOL/L (ref 15–95)
PROT UR-MCNC: <7 MG/DL
PROT/CREAT UR: NORMAL MG/G{CREAT}
RBC # BLD AUTO: 3.65 M/UL (ref 4–5.4)
RELATIVE EOSINOPHIL (OHS): 5.1 %
RELATIVE LYMPHOCYTE (OHS): 17.9 % (ref 18–48)
RELATIVE MONOCYTE (OHS): 17.1 % (ref 4–15)
RELATIVE NEUTROPHIL (OHS): 58.4 % (ref 38–73)
SODIUM SERPL-SCNC: 143 MMOL/L (ref 136–145)
SODIUM SERPL-SCNC: 146 MMOL/L (ref 136–145)
SODIUM UR-SCNC: 96 MMOL/L (ref 20–250)
UUN UR-MCNC: 191 MG/DL (ref 140–1050)
WBC # BLD AUTO: 5.85 K/UL (ref 3.9–12.7)

## 2025-04-26 PROCEDURE — 80048 BASIC METABOLIC PNL TOTAL CA: CPT | Performed by: INTERNAL MEDICINE

## 2025-04-26 PROCEDURE — 25000003 PHARM REV CODE 250: Performed by: NURSE PRACTITIONER

## 2025-04-26 PROCEDURE — 99233 SBSQ HOSP IP/OBS HIGH 50: CPT | Mod: ,,, | Performed by: INTERNAL MEDICINE

## 2025-04-26 PROCEDURE — 84300 ASSAY OF URINE SODIUM: CPT | Performed by: INTERNAL MEDICINE

## 2025-04-26 PROCEDURE — 85025 COMPLETE CBC W/AUTO DIFF WBC: CPT | Performed by: STUDENT IN AN ORGANIZED HEALTH CARE EDUCATION/TRAINING PROGRAM

## 2025-04-26 PROCEDURE — 84540 ASSAY OF URINE/UREA-N: CPT | Performed by: INTERNAL MEDICINE

## 2025-04-26 PROCEDURE — 96376 TX/PRO/DX INJ SAME DRUG ADON: CPT

## 2025-04-26 PROCEDURE — 83735 ASSAY OF MAGNESIUM: CPT | Performed by: STUDENT IN AN ORGANIZED HEALTH CARE EDUCATION/TRAINING PROGRAM

## 2025-04-26 PROCEDURE — 63600175 PHARM REV CODE 636 W HCPCS: Performed by: NURSE PRACTITIONER

## 2025-04-26 PROCEDURE — 83735 ASSAY OF MAGNESIUM: CPT | Performed by: NURSE PRACTITIONER

## 2025-04-26 PROCEDURE — 80069 RENAL FUNCTION PANEL: CPT | Performed by: STUDENT IN AN ORGANIZED HEALTH CARE EDUCATION/TRAINING PROGRAM

## 2025-04-26 PROCEDURE — 11000001 HC ACUTE MED/SURG PRIVATE ROOM

## 2025-04-26 PROCEDURE — 63600175 PHARM REV CODE 636 W HCPCS: Performed by: STUDENT IN AN ORGANIZED HEALTH CARE EDUCATION/TRAINING PROGRAM

## 2025-04-26 PROCEDURE — 82570 ASSAY OF URINE CREATININE: CPT | Mod: 59 | Performed by: INTERNAL MEDICINE

## 2025-04-26 PROCEDURE — 25000003 PHARM REV CODE 250: Performed by: STUDENT IN AN ORGANIZED HEALTH CARE EDUCATION/TRAINING PROGRAM

## 2025-04-26 PROCEDURE — 84156 ASSAY OF PROTEIN URINE: CPT | Performed by: INTERNAL MEDICINE

## 2025-04-26 PROCEDURE — 84133 ASSAY OF URINE POTASSIUM: CPT | Performed by: INTERNAL MEDICINE

## 2025-04-26 PROCEDURE — 25000003 PHARM REV CODE 250: Performed by: INTERNAL MEDICINE

## 2025-04-26 PROCEDURE — 36415 COLL VENOUS BLD VENIPUNCTURE: CPT | Performed by: STUDENT IN AN ORGANIZED HEALTH CARE EDUCATION/TRAINING PROGRAM

## 2025-04-26 PROCEDURE — 36415 COLL VENOUS BLD VENIPUNCTURE: CPT | Performed by: INTERNAL MEDICINE

## 2025-04-26 RX ORDER — ACETAMINOPHEN 325 MG/1
650 TABLET ORAL EVERY 6 HOURS PRN
Status: DISCONTINUED | OUTPATIENT
Start: 2025-04-26 | End: 2025-04-26

## 2025-04-26 RX ORDER — MAGNESIUM SULFATE HEPTAHYDRATE 40 MG/ML
2 INJECTION, SOLUTION INTRAVENOUS ONCE
Status: COMPLETED | OUTPATIENT
Start: 2025-04-26 | End: 2025-04-26

## 2025-04-26 RX ORDER — ACETAMINOPHEN 325 MG/1
650 TABLET ORAL EVERY 4 HOURS PRN
Status: DISCONTINUED | OUTPATIENT
Start: 2025-04-26 | End: 2025-04-27 | Stop reason: HOSPADM

## 2025-04-26 RX ADMIN — POTASSIUM BICARBONATE 50 MEQ: 978 TABLET, EFFERVESCENT ORAL at 10:04

## 2025-04-26 RX ADMIN — POTASSIUM BICARBONATE 50 MEQ: 978 TABLET, EFFERVESCENT ORAL at 12:04

## 2025-04-26 RX ADMIN — ACETAMINOPHEN 650 MG: 325 TABLET ORAL at 06:04

## 2025-04-26 RX ADMIN — NATAMYCIN 1 DROP: 50 SUSPENSION/ DROPS OPHTHALMIC at 05:04

## 2025-04-26 RX ADMIN — NATAMYCIN 1 DROP: 50 SUSPENSION/ DROPS OPHTHALMIC at 08:04

## 2025-04-26 RX ADMIN — NATAMYCIN 1 DROP: 50 SUSPENSION/ DROPS OPHTHALMIC at 04:04

## 2025-04-26 RX ADMIN — POTASSIUM CHLORIDE 10 MEQ: 7.46 INJECTION, SOLUTION INTRAVENOUS at 02:04

## 2025-04-26 RX ADMIN — NATAMYCIN 1 DROP: 50 SUSPENSION/ DROPS OPHTHALMIC at 12:04

## 2025-04-26 RX ADMIN — NATAMYCIN 1 DROP: 50 SUSPENSION/ DROPS OPHTHALMIC at 02:04

## 2025-04-26 RX ADMIN — POTASSIUM CHLORIDE 10 MEQ: 7.46 INJECTION, SOLUTION INTRAVENOUS at 12:04

## 2025-04-26 RX ADMIN — POTASSIUM BICARBONATE 50 MEQ: 978 TABLET, EFFERVESCENT ORAL at 08:04

## 2025-04-26 RX ADMIN — POTASSIUM CHLORIDE 10 MEQ: 7.46 INJECTION, SOLUTION INTRAVENOUS at 03:04

## 2025-04-26 RX ADMIN — ACETAMINOPHEN 650 MG: 325 TABLET ORAL at 08:04

## 2025-04-26 RX ADMIN — NATAMYCIN 1 DROP: 50 SUSPENSION/ DROPS OPHTHALMIC at 06:04

## 2025-04-26 RX ADMIN — NATAMYCIN 1 DROP: 50 SUSPENSION/ DROPS OPHTHALMIC at 10:04

## 2025-04-26 RX ADMIN — ACETAMINOPHEN 650 MG: 325 TABLET ORAL at 01:04

## 2025-04-26 RX ADMIN — POTASSIUM CHLORIDE 10 MEQ: 7.46 INJECTION, SOLUTION INTRAVENOUS at 01:04

## 2025-04-26 RX ADMIN — MAGNESIUM SULFATE HEPTAHYDRATE 2 G: 40 INJECTION, SOLUTION INTRAVENOUS at 08:04

## 2025-04-26 NOTE — CONSULTS
"[facility] - Medical / Clinical  Nephrology  CONSULT Note      Patient Name: Carie Howell   MRN: [id number]   Current Provider: Herman Patton MD  Primary Care Provider: Jacqueline Serna MD   Admission Date: 4/25/2025   Hospital Day: 0  Bed: 70439/95184 A  Principal Problem: JOSE (acute kidney injury)       SUBJECTIVE    Carie Howell is a 54 y.o. 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    Admitted with abnormal outpatient labs as ordered by her PCP for pre-operative clearance for plans to get a prosthetic eye. Labs showed JOSE and hypoKalemia, sent to ER. She was recently admitted betweent 4/8-4/10 where she was treated with IV micafungin. Then was discharged on amphoteracin B 300 that was increased to 600 last week.     Her labs today showed raised in the Cr     Nephrology was consulted with for JOSE,       Review of Systems: Negative except for as stated in history of present illness    OBJECTIVE     Vitals:  BP (!) 99/58 (BP Location: Left arm, Patient Position: Sitting)   Pulse 61   Temp 97.8 °F (36.6 °C) (Oral)   Resp 20   Ht 5' 5" (1.651 m)   Wt 59 kg (130 lb)   SpO2 100%   Breastfeeding No   BMI 21.63 kg/m²    I/O last 3 completed shifts:  In: 2000 [IV Piggyback:2000]  Out: -    Net IO Since Admission: 2,000 mL [04/26/25 1831]    Physical Examination:  HEENT: NC.   Neck: atrumatic  Heart: audible heart sounds, no edema  Lungs: CTAB, no w/r/r      MEDICATIONS & LABS         natamycin  1 drop Left Eye Q2H While awake     Current Outpatient Medications   Medication Instructions    ibuprofen (ADVIL,MOTRIN) 400 mg, Oral, Every 8 hours PRN    natamycin (NATACYN) 5 % ophthalmic solution Instill 1 drop into the left eye every hour around the clock    omeprazole (PRILOSEC) 20 MG capsule 1 po daily x 2-4 wks    ondansetron (ZOFRAN-ODT) 8 mg, Oral, Every 8 hours PRN       Relevant Data:  Trend: " "No results found for: "CYSTATINCSER"  Trend:   Lab Results   Component Value Date    BUN 37 (H) 04/26/2025    BUN 45 (H) 04/25/2025    BUN 54 (H) 04/25/2025     Trend:     No results found for: "HCO3"  Trend:   Vitals:    04/26/25 0731 04/26/25 1000 04/26/25 1129 04/26/25 1600   BP: (!) 109/56  112/60 (!) 99/58   BP Location: Left arm  Left arm Left arm   Patient Position: Lying  Lying Sitting   Pulse: 69 64 64 61   Resp: 20  18 20   Temp: 98.6 °F (37 °C)  97.6 °F (36.4 °C) 97.8 °F (36.6 °C)   TempSrc: Oral  Oral Oral   SpO2: 97%  98% 100%   Weight:       Height:           I/O last 3 completed shifts:  In: 2000 [IV Piggyback:2000]  Out: -    Net IO Since Admission: 2,000 mL [04/26/25 1831]  Trend:   Phosphorus Level   Date Value Ref Range Status   04/10/2025 2.7 2.5 - 4.7 mg/dL Final     Trend:   Lab Results   Component Value Date    HGB 10.7 (L) 04/26/2025    HGB 11.7 (L) 04/25/2025    HGB 12.1 04/25/2025         Trend:   Calcium   Date Value Ref Range Status   04/26/2025 8.2 (L) 8.7 - 10.5 mg/dL Final   04/25/2025 8.0 (L) 8.7 - 10.5 mg/dL Final   04/25/2025 8.9 8.7 - 10.5 mg/dL Final     Albumin   Date Value Ref Range Status   04/26/2025 3.0 (L) 3.5 - 5.2 g/dL Final   04/25/2025 2.8 (L) 3.5 - 5.2 g/dL Final       MEDICAL HISTORY    Past Medical History:  Past Medical History:   Diagnosis Date    Corneal ulcer of left eye     GERD (gastroesophageal reflux disease)     Hearing impairment        Past Surgical History:  Past Surgical History:   Procedure Laterality Date    CORNEAL TRANSPLANT  02/19/2025    VAGINAL DELIVERY      x 2       Family History:   Family History   Problem Relation Name Age of Onset    Coronary artery disease Mother Imelda Kta 67    Arthritis Mother Imelda Kat     Coronary artery disease Father  70    Heart attack Father  70    Heart disease Brother Parish Stephens 55        s/p PCI      Review of patient's allergies indicates:  No Known Allergies  Social History[1]         ASSESSMENT AND " PLAN:    Carie Howell is a 54 y.o. 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 She also complaining of  Nausea, vomiting and low PO intake. She also has soft BP with lowest SBP 95.    Nephrology  is consulted for JOSE    Impression:  JOSE stage 1  Baseline Creatinine: 1.7  Creatinine at time of consult: 0.4-0.5  Tests done: UA trace protein, Trace Blood, 14 WBC  Etiology of JOSE: Medication induced (Amphotericin B), pre-renal, hemodynamic instability.    Electrolyte disorder : hypokalemia (2.8)    Recommendations :   - Kindly consider IV K 40mEQ  - Repeat labs in evening   - If still low K then consider a dose of IV K 20mEq  - Avoid nephrotoxics   - Maintain MAPs >65  - Keep well hydrated   Monitor serum chemistries daily, strict intake and output Qshift , daily weights if able.  Renal protective measures: Please adjust medications for reduced clearance  Avoid nephrotoxic medications (NSAID, IV contrast)  Avoid ACEi and ARBs in the setting of JOSE  Maintain MAP > 65  Transfuse for Hb <7    Thank you for allowing us to participate in the care of this patient.  Plan discussed with attending. Please call with any questions or concerns.           Vivek Samayoa MD.  Clinical Nephrology Fellow, PGY-4  [facility] [address]         [1]   Social History  Socioeconomic History    Marital status:    Occupational History    Occupation: Teacher   Tobacco Use    Smoking status: Never     Passive exposure: Never    Smokeless tobacco: Never   Substance and Sexual Activity    Alcohol use: Yes     Comment: socially    Drug use: Never    Sexual activity: Yes     Partners: Male     Social Drivers of Health     Financial Resource Strain: Low Risk  (4/26/2025)    Overall Financial Resource Strain (CARDIA)     Difficulty of Paying Living Expenses: Not hard at all   Food Insecurity: No Food Insecurity  (4/26/2025)    Hunger Vital Sign     Worried About Running Out of Food in the Last Year: Never true     Ran Out of Food in the Last Year: Never true   Transportation Needs: No Transportation Needs (4/26/2025)    PRAPARE - Transportation     Lack of Transportation (Medical): No     Lack of Transportation (Non-Medical): No   Physical Activity: Inactive (4/26/2025)    Exercise Vital Sign     Days of Exercise per Week: 0 days     Minutes of Exercise per Session: 0 min   Stress: No Stress Concern Present (4/26/2025)    [employer] [place] [employer] - Occupational Stress Questionnaire     Feeling of Stress : Not at all   Housing Stability: Low Risk  (4/26/2025)    Housing Stability Vital Sign     Unable to Pay for Housing in the Last Year: No     Homeless in the Last Year: No

## 2025-04-26 NOTE — PROGRESS NOTES
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 She was seen in PCP clinic on 4/25 for pre-operative clearance for plans to get a prosthetic eye and was told to present to the ER given new JOSE and hypokalemia. Of note, the patient was in her usual state of health up until Saturday, 4/19 when she began to have intermittent nausea with vomiting. This caused poor po intake, but she was trying to keep up with hydration via Gatorade. She was admitted to  from 4/8- 4/10 and it was during that time that she was started on IV micafungin. She notes the dose was recently increased on 4/18 and she continued this higher dose of micafungin through 4/22. She was told on Monday that anti-fungals were no longer controlling the infection and that she would require surgery. She had her PICC removed on 4/24. Of note, she denies any light headedness or LOC. She did not notice any change in her urination, though she has peed quite frequently since arrival to [facility]. She denies any fevers, chills, abdominal pain or discomfort.     Labs at the time of admission as follows: WBC 6K, Na 142, K 2.4, HCO3 20, BUN 54, CR 2.6, AG 13. Admitted to hospital medicine and nephrology consulted.     Overview/Hospital Course:  No notes on file    Interval History: New admit ARF, possibly due to amphotericin, renal consult is pending.    Review of Systems  Objective:     Vital Signs (Most Recent):  Temp: 98.6 °F (37 °C) (04/26/25  0731)  Pulse: 69 (04/26/25 0731)  Resp: 20 (04/26/25 0731)  BP: (!) 109/56 (04/26/25 0731)  SpO2: 97 % (04/26/25 0731) Vital Signs (24h Range):  Temp:  [97.4 °F (36.3 °C)-99.1 °F (37.3 °C)] 98.6 °F (37 °C)  Pulse:  [67-87] 69  Resp:  [14-20] 20  SpO2:  [97 %-100 %] 97 %  BP: ()/(49-66) 109/56     Weight: 59 kg (130 lb)  Body mass index is 21.63 kg/m².    Intake/Output Summary (Last 24 hours) at 4/26/2025 0824  Last data filed at 4/25/2025 2102  Gross per 24 hour   Intake 2000 ml   Output --   Net 2000 ml      Physical Exam  Vitals reviewed.   Constitutional:       General: She is not in acute distress.     Appearance: She is not ill-appearing.   HENT:      Nose: Nose normal.      Mouth/Throat:      Mouth: Mucous membranes are moist.   Eyes:      General: No scleral icterus.     Comments: Left eye opacified    Cardiovascular:      Rate and Rhythm: Normal rate.      Pulses: Normal pulses.   Pulmonary:      Effort: Pulmonary effort is normal. No respiratory distress.   Abdominal:      General: Abdomen is flat. There is no distension.      Palpations: Abdomen is soft.      Tenderness: There is no abdominal tenderness. There is no guarding.   Musculoskeletal:         General: No swelling.   Skin:     General: Skin is warm and dry.      Coloration: Skin is not jaundiced.   Neurological:      Mental Status: She is alert and oriented to person, place, and time. Mental status is at baseline.   Psychiatric:         Mood and Affect: Mood normal.         Behavior: Behavior normal.         Computed MELD 3.0 unavailable. One or more values for this score either were not found within the given timeframe or did not fit some other criterion.  Computed MELD-Na unavailable. One or more values for this score either were not found within the given timeframe or did not fit some other criterion.      Significant Labs:  CBC:  Recent Labs   Lab 04/25/25  1044 04/25/25  1713 04/26/25  0549   WBC 7.56 6.53 5.85   HGB 12.1 11.7* 10.7*  "  HCT 34.0* 31.4* 28.9*    344 291     CMP:  Recent Labs   Lab 04/25/25  1044 04/25/25  1713 04/25/25  2221 04/26/25  0549    142 144 143   K 2.7* 2.4* 2.7* 2.8*   * 109 116* 116*   CO2 19* 20* 18* 18*   BUN 53* 54* 45* 37*   CREATININE 2.6* 2.6* 2.1* 1.7*   CALCIUM 8.9 8.9 8.0* 8.2*   ALBUMIN 3.4* 3.5 2.8* 3.0*   BILITOT 0.2 0.2  --   --    ALKPHOS 117 119  --   --    AST 17 14  --   --    ALT 14 14  --   --    ANIONGAP 13 13 10 9     PTINR:  No results for input(s): "INR" in the last 48 hours.    Significant Procedures:   Dobutamine Stress Test with Color Flow: No results found for this or any previous visit.    None      Assessment & Plan  JOSE (acute kidney injury)   Baseline creatinine is  0.5 . Most recent creatinine and eGFR are listed below.  Recent Labs     04/25/25 1713 04/25/25 2221 04/26/25  0549   CREATININE 2.6* 2.1* 1.7*   EGFRNORACEVR 21* 28* 35*      Plan  - JOSE is stable, F/u RFP following IVF's given in ED and K replacement   - Avoid nephrotoxins and renally dose meds for GFR listed above  - Monitor urine output, serial BMP, and adjust therapy as needed.  - Strict intake and output  - F/u USRP  - F/u urine lytes  - F/u UPCR   - Cardiac monitoring   - Nephrology consulted to spin urine; suspect RTA Type 1 from amphotericin vs ATN   Fungal endophthalmitis  - Resume home eye drops   - Not currently on systemic therapy   - No indication to consult ophthalmology at this time, can consider if needed   Hypokalemia  Patient's most recent potassium results are listed below.   Recent Labs     04/25/25  1713 04/25/25  2221 04/26/25  0549   K 2.4* 2.7* 2.8*     Plan  - Replace potassium per protocol  - F/u RFP  - Nephrology consulted given suspicion for RTA Type 1 vs ATN to spin urine   VTE Risk Mitigation (From admission, onward)           Ordered     IP VTE LOW RISK PATIENT  Once         04/25/25 2154     Place sequential compression device  Until discontinued         04/25/25 2154      "               Discharge Planning   DO:      Code Status: Full Code   Medical Readiness for Discharge Date:                            Herman Patton MD  Department of Hospital Medicine   [facility] - Acute Medical Stepdown

## 2025-04-26 NOTE — ED NOTES
MD Horace at bedside instructed RN that she wanted to bolus patient's NaCl infusion at this time. Infusion initiated.

## 2025-04-26 NOTE — H&P
[facility]  History & Physical    Patient Name: Carie Howell  MRN: [id number]  Patient Class: OP- Observation  Admission Date: 4/25/2025  Attending Physician: Cory Howell MD   Primary Care Provider: Jacqueline Serna MD         Patient information was obtained from patient, past medical records, and ER records.     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 She was seen in PCP clinic on 4/25 for pre-operative clearance for plans to get a prosthetic eye and was told to present to the ER given new JOSE and hypokalemia. Of note, the patient was in her usual state of health up until Saturday, 4/19 when she began to have intermittent nausea with vomiting. This caused poor po intake, but she was trying to keep up with hydration via Gatorade. She was admitted to  from 4/8- 4/10 and it was during that time that she was started on IV micafungin. She notes the dose was recently increased on 4/18 and she continued this higher dose of micafungin through 4/22. She was told on Monday that anti-fungals were no longer controlling the infection and that she would require surgery. She had her PICC removed on 4/24. Of note, she denies any light headedness or LOC. She did not notice any change in her urination, though she has peed quite frequently since [place] [facility] She denies any fevers, chills, abdominal pain or discomfort.     Labs at the time of admission as follows: WBC 6K, Na 142, K 2.4, HCO3 20, BUN 54, CR 2.6, AG 13. Admitted to hospital medicine and nephrology consulted.     Past Medical  History:   Diagnosis Date    Corneal ulcer of left eye     GERD (gastroesophageal reflux disease)     Hearing impairment        Past Surgical History:   Procedure Laterality Date    CORNEAL TRANSPLANT  02/19/2025    VAGINAL DELIVERY      x 2       Review of patient's allergies indicates:  No Known Allergies    No current facility-administered medications on file prior to encounter.     Current Outpatient Medications on File Prior to Encounter   Medication Sig    ibuprofen (ADVIL,MOTRIN) 200 MG tablet Take 2 tablets (400 mg total) by mouth every 8 (eight) hours as needed for Pain.    natamycin (NATACYN) 5 % ophthalmic solution Instill 1 drop into the left eye every hour around the clock    omeprazole (PRILOSEC) 20 MG capsule 1 po daily x 2-4 wks    ondansetron (ZOFRAN-ODT) 8 MG TbDL Take 1 tablet (8 mg total) by mouth every 8 (eight) hours as needed.     Family History       Problem Relation (Age of Onset)    Arthritis Mother    Coronary artery disease Mother (67), Father (70)    Heart attack Father (70)    Heart disease Brother (55)          Tobacco Use    Smoking status: Never     Passive exposure: Never    Smokeless tobacco: Never   Substance and Sexual Activity    Alcohol use: Yes     Comment: socially    Drug use: Never    Sexual activity: Yes     Partners: Male     Review of Systems   Constitutional:  Negative for chills and fever.   HENT:  Negative for congestion and sore throat.    Respiratory:  Negative for cough and shortness of breath.    Cardiovascular:  Negative for chest pain, palpitations and leg swelling.   Gastrointestinal:  Positive for nausea and vomiting. Negative for abdominal pain, constipation, diarrhea and rectal pain.   Genitourinary:  Negative for dysuria, frequency and urgency.   Musculoskeletal:  Negative for back pain and neck pain.   Skin:  Negative for rash and wound.   Neurological:  Negative for dizziness, light-headedness and headaches.   Psychiatric/Behavioral:  Negative for  agitation and confusion.      Objective:     Vital Signs (Most Recent):  Temp: 99.1 °F (37.3 °C) (04/25/25 2200)  Pulse: 72 (04/25/25 2200)  Resp: 15 (04/25/25 2200)  BP: (!) 111/54 (04/25/25 2200)  SpO2: 99 % (04/25/25 2200) Vital Signs (24h Range):  Temp:  [97.4 °F (36.3 °C)-99.1 °F (37.3 °C)] 99.1 °F (37.3 °C)  Pulse:  [67-87] 72  Resp:  [14-19] 15  SpO2:  [97 %-100 %] 99 %  BP: ()/(49-66) 111/54     Weight: 59 kg (130 lb)  Body mass index is 21.63 kg/m².     Physical Exam  Vitals reviewed.   Constitutional:       General: She is not in acute distress.     Appearance: She is not ill-appearing.   HENT:      Nose: Nose normal.      Mouth/Throat:      Mouth: Mucous membranes are moist.   Eyes:      General: No scleral icterus.     Comments: Left eye opacified    Cardiovascular:      Rate and Rhythm: Normal rate.      Pulses: Normal pulses.   Pulmonary:      Effort: Pulmonary effort is normal. No respiratory distress.   Abdominal:      General: Abdomen is flat. There is no distension.      Palpations: Abdomen is soft.      Tenderness: There is no abdominal tenderness. There is no guarding.   Musculoskeletal:         General: No swelling.   Skin:     General: Skin is warm and dry.      Coloration: Skin is not jaundiced.   Neurological:      Mental Status: She is alert and oriented to person, place, and time. Mental status is at baseline.   Psychiatric:         Mood and Affect: Mood normal.         Behavior: Behavior normal.                Significant Labs: All pertinent labs within the past 24 hours have been reviewed.    Significant Imaging: I have reviewed all pertinent imaging results/findings within the past 24 hours.  Assessment/Plan:     Assessment & Plan  JOSE (acute kidney injury)   Baseline creatinine is  0.5 . Most recent creatinine and eGFR are listed below.  Recent Labs     04/25/25  1044 04/25/25  1713   CREATININE 2.6* 2.6*   EGFRNORACEVR 21* 21*      Plan  - JOSE is stable, F/u RFP following IVF's  given in ED and K replacement   - Avoid nephrotoxins and renally dose meds for GFR listed above  - Monitor urine output, serial BMP, and adjust therapy as needed.  - Strict intake and output  - F/u USRP  - F/u urine lytes  - F/u UPCR   - Cardiac monitoring   - Nephrology consulted to spin urine; suspect RTA Type 1 from amphotericin vs ATN   Fungal endophthalmitis  - Resume home eye drops   - Not currently on systemic therapy   - No indication to consult ophthalmology at this time, can consider if needed   Hypokalemia  Patient's most recent potassium results are listed below.   Recent Labs     04/25/25  1044 04/25/25  1713   K 2.7* 2.4*     Plan  - Replace potassium per protocol  - F/u RFP  - Nephrology consulted given suspicion for RTA Type 1 vs ATN to spin urine   VTE Risk Mitigation (From admission, onward)           Ordered     IP VTE LOW RISK PATIENT  Once         04/25/25 2154     Place sequential compression device  Until discontinued         04/25/25 2154                  On 04/25/2025, patient should be placed in hospital observation services under my care.         Glendy Paniagua DO  Department of Hospital Medicine  [facility] - [facility]

## 2025-04-26 NOTE — SUBJECTIVE & OBJECTIVE
Past Medical History:   Diagnosis Date    Corneal ulcer of left eye     GERD (gastroesophageal reflux disease)     Hearing impairment        Past Surgical History:   Procedure Laterality Date    CORNEAL TRANSPLANT  02/19/2025    VAGINAL DELIVERY      x 2       Review of patient's allergies indicates:  No Known Allergies    No current facility-administered medications on file prior to encounter.     Current Outpatient Medications on File Prior to Encounter   Medication Sig    ibuprofen (ADVIL,MOTRIN) 200 MG tablet Take 2 tablets (400 mg total) by mouth every 8 (eight) hours as needed for Pain.    natamycin (NATACYN) 5 % ophthalmic solution Instill 1 drop into the left eye every hour around the clock    omeprazole (PRILOSEC) 20 MG capsule 1 po daily x 2-4 wks    ondansetron (ZOFRAN-ODT) 8 MG TbDL Take 1 tablet (8 mg total) by mouth every 8 (eight) hours as needed.     Family History       Problem Relation (Age of Onset)    Arthritis Mother    Coronary artery disease Mother (67), Father (70)    Heart attack Father (70)    Heart disease Brother (55)          Tobacco Use    Smoking status: Never     Passive exposure: Never    Smokeless tobacco: Never   Substance and Sexual Activity    Alcohol use: Yes     Comment: socially    Drug use: Never    Sexual activity: Yes     Partners: Male     Review of Systems   Constitutional:  Negative for chills and fever.   HENT:  Negative for congestion and sore throat.    Respiratory:  Negative for cough and shortness of breath.    Cardiovascular:  Negative for chest pain, palpitations and leg swelling.   Gastrointestinal:  Positive for nausea and vomiting. Negative for abdominal pain, constipation, diarrhea and rectal pain.   Genitourinary:  Negative for dysuria, frequency and urgency.   Musculoskeletal:  Negative for back pain and neck pain.   Skin:  Negative for rash and wound.   Neurological:  Negative for dizziness, light-headedness and headaches.   Psychiatric/Behavioral:   Negative for agitation and confusion.      Objective:     Vital Signs (Most Recent):  Temp: 99.1 °F (37.3 °C) (04/25/25 2200)  Pulse: 72 (04/25/25 2200)  Resp: 15 (04/25/25 2200)  BP: (!) 111/54 (04/25/25 2200)  SpO2: 99 % (04/25/25 2200) Vital Signs (24h Range):  Temp:  [97.4 °F (36.3 °C)-99.1 °F (37.3 °C)] 99.1 °F (37.3 °C)  Pulse:  [67-87] 72  Resp:  [14-19] 15  SpO2:  [97 %-100 %] 99 %  BP: ()/(49-66) 111/54     Weight: 59 kg (130 lb)  Body mass index is 21.63 kg/m².     Physical Exam  Vitals reviewed.   Constitutional:       General: She is not in acute distress.     Appearance: She is not ill-appearing.   HENT:      Nose: Nose normal.      Mouth/Throat:      Mouth: Mucous membranes are moist.   Eyes:      General: No scleral icterus.     Comments: Left eye opacified    Cardiovascular:      Rate and Rhythm: Normal rate.      Pulses: Normal pulses.   Pulmonary:      Effort: Pulmonary effort is normal. No respiratory distress.   Abdominal:      General: Abdomen is flat. There is no distension.      Palpations: Abdomen is soft.      Tenderness: There is no abdominal tenderness. There is no guarding.   Musculoskeletal:         General: No swelling.   Skin:     General: Skin is warm and dry.      Coloration: Skin is not jaundiced.   Neurological:      Mental Status: She is alert and oriented to person, place, and time. Mental status is at baseline.   Psychiatric:         Mood and Affect: Mood normal.         Behavior: Behavior normal.                Significant Labs: All pertinent labs within the past 24 hours have been reviewed.    Significant Imaging: I have reviewed all pertinent imaging results/findings within the past 24 hours.

## 2025-04-26 NOTE — ASSESSMENT & PLAN NOTE
- Resume home eye drops   - Not currently on systemic therapy   - No indication to consult ophthalmology at this time, can consider if needed

## 2025-04-26 NOTE — HPI
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 She was seen in PCP clinic on 4/25 for pre-operative clearance for plans to get a prosthetic eye and was told to present to the ER given new JOSE and hypokalemia. Of note, the patient was in her usual state of health up until Saturday, 4/19 when she began to have intermittent nausea with vomiting. This caused poor po intake, but she was trying to keep up with hydration via Gatorade. She was admitted to  from 4/8- 4/10 and it was during that time that she was started on IV micafungin. She notes the dose was recently increased on 4/18 and she continued this higher dose of micafungin through 4/22. She was told on Monday that anti-fungals were no longer controlling the infection and that she would require surgery. She had her PICC removed on 4/24. Of note, she denies any light headedness or LOC. She did not notice any change in her urination, though she has peed quite frequently since arrival to [facility]. She denies any fevers, chills, abdominal pain or discomfort.     Labs at the time of admission as follows: WBC 6K, Na 142, K 2.4, HCO3 20, BUN 54, CR 2.6, AG 13. Admitted to hospital medicine and nephrology consulted.

## 2025-04-26 NOTE — ASSESSMENT & PLAN NOTE
Baseline creatinine is 0.5. Most recent creatinine and eGFR are listed below.  Recent Labs     04/25/25  1044 04/25/25  1713   CREATININE 2.6* 2.6*   EGFRNORACEVR 21* 21*      Plan  - JOSE is stable, F/u RFP following IVF's given in ED and K replacement   - Avoid nephrotoxins and renally dose meds for GFR listed above  - Monitor urine output, serial BMP, and adjust therapy as needed.  - Strict intake and output  - F/u USRP  - F/u urine lytes  - F/u UPCR   - Cardiac monitoring   - Nephrology consulted to spin urine; suspect RTA Type 1 from amphotericin vs ATN

## 2025-04-26 NOTE — PLAN OF CARE
Problem: Acute Kidney Injury/Impairment  Goal: Fluid and Electrolyte Balance  Outcome: Progressing  Goal: Improved Oral Intake  Outcome: Progressing  Goal: Effective Renal Function  Outcome: Progressing     Problem: Adult Inpatient Plan of Care  Goal: Plan of Care Review  Outcome: Progressing  Goal: Patient-Specific Goal (Individualized)  Outcome: Progressing  Goal: Absence of Hospital-Acquired Illness or Injury  Outcome: Progressing  Goal: Optimal Comfort and Wellbeing  Outcome: Progressing  Goal: Readiness for Transition of Care  Outcome: Progressing    Patient A & O x 4. VSS. Room air. L eye drops bedside Q2. Complaint of pain to eye; tylenol PRN q4 given x 2. US Retroperitoneal.  Family at bedside. Bed low and locked position. Call light within reach.

## 2025-04-26 NOTE — ASSESSMENT & PLAN NOTE
Patient's most recent potassium results are listed below.   Recent Labs     04/25/25  1713 04/25/25  2221 04/26/25  0549   K 2.4* 2.7* 2.8*     Plan  - Replace potassium per protocol  - F/u RFP  - Nephrology consulted given suspicion for RTA Type 1 vs ATN to spin urine

## 2025-04-26 NOTE — SUBJECTIVE & OBJECTIVE
Interval History: New admit ARF, possibly due to amphotericin, renal consult is pending.    Review of Systems  Objective:     Vital Signs (Most Recent):  Temp: 98.6 °F (37 °C) (04/26/25 0731)  Pulse: 69 (04/26/25 0731)  Resp: 20 (04/26/25 0731)  BP: (!) 109/56 (04/26/25 0731)  SpO2: 97 % (04/26/25 0731) Vital Signs (24h Range):  Temp:  [97.4 °F (36.3 °C)-99.1 °F (37.3 °C)] 98.6 °F (37 °C)  Pulse:  [67-87] 69  Resp:  [14-20] 20  SpO2:  [97 %-100 %] 97 %  BP: ()/(49-66) 109/56     Weight: 59 kg (130 lb)  Body mass index is 21.63 kg/m².    Intake/Output Summary (Last 24 hours) at 4/26/2025 0824  Last data filed at 4/25/2025 2102  Gross per 24 hour   Intake 2000 ml   Output --   Net 2000 ml      Physical Exam  Vitals reviewed.   Constitutional:       General: She is not in acute distress.     Appearance: She is not ill-appearing.   HENT:      Nose: Nose normal.      Mouth/Throat:      Mouth: Mucous membranes are moist.   Eyes:      General: No scleral icterus.     Comments: Left eye opacified    Cardiovascular:      Rate and Rhythm: Normal rate.      Pulses: Normal pulses.   Pulmonary:      Effort: Pulmonary effort is normal. No respiratory distress.   Abdominal:      General: Abdomen is flat. There is no distension.      Palpations: Abdomen is soft.      Tenderness: There is no abdominal tenderness. There is no guarding.   Musculoskeletal:         General: No swelling.   Skin:     General: Skin is warm and dry.      Coloration: Skin is not jaundiced.   Neurological:      Mental Status: She is alert and oriented to person, place, and time. Mental status is at baseline.   Psychiatric:         Mood and Affect: Mood normal.         Behavior: Behavior normal.         Computed MELD 3.0 unavailable. One or more values for this score either were not found within the given timeframe or did not fit some other criterion.  Computed MELD-Na unavailable. One or more values for this score either were not found within the given  "timeframe or did not fit some other criterion.      Significant Labs:  CBC:  Recent Labs   Lab 04/25/25  1044 04/25/25  1713 04/26/25  0549   WBC 7.56 6.53 5.85   HGB 12.1 11.7* 10.7*   HCT 34.0* 31.4* 28.9*    344 291     CMP:  Recent Labs   Lab 04/25/25  1044 04/25/25  1713 04/25/25  2221 04/26/25  0549    142 144 143   K 2.7* 2.4* 2.7* 2.8*   * 109 116* 116*   CO2 19* 20* 18* 18*   BUN 53* 54* 45* 37*   CREATININE 2.6* 2.6* 2.1* 1.7*   CALCIUM 8.9 8.9 8.0* 8.2*   ALBUMIN 3.4* 3.5 2.8* 3.0*   BILITOT 0.2 0.2  --   --    ALKPHOS 117 119  --   --    AST 17 14  --   --    ALT 14 14  --   --    ANIONGAP 13 13 10 9     PTINR:  No results for input(s): "INR" in the last 48 hours.    Significant Procedures:   Dobutamine Stress Test with Color Flow: No results found for this or any previous visit.    None  "

## 2025-04-26 NOTE — PLAN OF CARE
[facility] - Acute Medical Stepdown  Initial Discharge Assessment       Primary Care Provider: Jacqueline Serna MD    Admission Diagnosis: Hypokalemia [E87.6]  Nausea & vomiting [R11.2]  Abnormal laboratory test [R89.9]  JOSE (acute kidney injury) [N17.9]  Hypotension due to hypovolemia [E86.1]    Admission Date: 4/25/2025  Expected Discharge Date:     Transition of Care Barriers: None    Payor: AETNA / Plan: AETNA CHOICE POS / Product Type: Commercial /     Extended Emergency Contact Information  Primary Emergency Contact: Efrem Howell  Address: [address] [place] [address] [place]  Mobile Phone: [phone]  Relation: Spouse  Preferred language: English   needed? No    Discharge Plan A: Home with family  Discharge Plan B: Home      [id number] [address] AT [facility] [address] [address]  Phone: [phone] Fax: [phone]      Initial Assessment (most recent)       Adult Discharge Assessment - 04/26/25 1232          Discharge Assessment    Assessment Type Discharge Planning Assessment     Confirmed/corrected address, phone number and insurance Yes     Confirmed Demographics Correct on Facesheet     Source of Information patient     Communicated DO with patient/caregiver Yes     Reason For Admission JOSE (acute kidney injury)     People in Home spouse     Do you expect to return to your current living situation? Yes     Do you have help at home or someone to help you manage your care at home? No     Prior to hospitilization cognitive status: Alert/Oriented     Current cognitive status: Alert/Oriented     Walking or Climbing Stairs Difficulty no     Dressing/Bathing Difficulty no     Home Layout Able to live on 1st floor     Equipment Currently Used at Home none     Readmission within 30 days? No     Patient currently being followed by outpatient case management? No      Do you currently have service(s) that help you manage your care at home? No     Do you take prescription medications? Yes     Do you have prescription coverage? Yes     Coverage Payor: AETNA - AETNA CHOICE POS -     Do you have any problems affording any of your prescribed medications? No     Is the patient taking medications as prescribed? yes     Who is going to help you get home at discharge? Onur Howell      How do you get to doctors appointments? car, drives self;family or friend will provide     Are you on dialysis? No     Do you take coumadin? No     Discharge Plan A Home with family     Discharge Plan B Home     DME Needed Upon Discharge  none     Discharge Plan discussed with: Patient     Transition of Care Barriers None        Physical Activity    On average, how many days per week do you engage in moderate to strenuous exercise (like a brisk walk)? 0 days     On average, how many minutes do you engage in exercise at this level? 0 min        Financial Resource Strain    How hard is it for you to pay for the very basics like food, housing, medical care, and heating? Not hard at all        Housing Stability    In the last 12 months, was there a time when you were not able to pay the mortgage or rent on time? No     At any time in the past 12 months, were you homeless or living in a shelter (including now)? No        Transportation Needs    In the past 12 months, has lack of transportation kept you from medical appointments or from getting medications? No     In the past 12 months, has lack of transportation kept you from meetings, work, or from getting things needed for daily living? No        Food Insecurity    Within the past 12 months, you worried that your food would run out before you got the money to buy more. Never true     Within the past 12 months, the food you bought just didn't last and you didn't have money to get more. Never true        Stress    Do you feel stress - tense,  restless, nervous, or anxious, or unable to sleep at night because your mind is troubled all the time - these days? Not at all        Social Isolation    How often do you feel lonely or isolated from those around you?  Never        Alcohol Use    Q1: How often do you have a drink containing alcohol? Never     Q2: How many drinks containing alcohol do you have on a typical day when you are drinking? Patient does not drink     Q3: How often do you have six or more drinks on one occasion? Never        Utilities    In the past 12 months has the electric, gas, oil, or water company threatened to shut off services in your home? No        Health Literacy    How often do you need to have someone help you when you read instructions, pamphlets, or other written material from your doctor or pharmacy? Never        OTHER    Name(s) of People in Home Onur Howell                    The SW met with the patient at bedside. The SW placed name and contact information on the blackboard in the patient's room. Use preferred pharmacy / bedside delivery for any necessary medications at the time of discharge. The patient is independent with all ADLs. The patient is not on Dialysis or Coumadin. The Patient does not use DME or home oxygen, The patient's spouse will provide assistance to the patient upon discharge. The patient's spouse  will provide transportation upon discharge. SW will continue to follow for course of hospitalization.  A discharge planning booklet was left at bedside.        Discharge Plan A and Plan B have been determined by review of patient's clinical status, future medical and therapeutic needs, and coverage/benefits for post-acute care in coordination with multidisciplinary team members.    Maira Paulson MSW, DAGOBERTOW  [facility]  Case Management Dept.

## 2025-04-26 NOTE — ASSESSMENT & PLAN NOTE
Patient's most recent potassium results are listed below.   Recent Labs     04/25/25  1044 04/25/25  1713   K 2.7* 2.4*     Plan  - Replace potassium per protocol  - F/u RFP  - Nephrology consulted given suspicion for RTA Type 1 vs ATN to spin urine

## 2025-04-26 NOTE — ASSESSMENT & PLAN NOTE
Baseline creatinine is 0.5. Most recent creatinine and eGFR are listed below.  Recent Labs     04/25/25  1713 04/25/25  2221 04/26/25  0549   CREATININE 2.6* 2.1* 1.7*   EGFRNORACEVR 21* 28* 35*      Plan  - JOSE is stable, F/u RFP following IVF's given in ED and K replacement   - Avoid nephrotoxins and renally dose meds for GFR listed above  - Monitor urine output, serial BMP, and adjust therapy as needed.  - Strict intake and output  - F/u USRP  - F/u urine lytes  - F/u UPCR   - Cardiac monitoring   - Nephrology consulted to spin urine; suspect RTA Type 1 from amphotericin vs ATN

## 2025-04-27 VITALS
TEMPERATURE: 98 F | WEIGHT: 130 LBS | DIASTOLIC BLOOD PRESSURE: 58 MMHG | HEIGHT: 65 IN | HEART RATE: 79 BPM | OXYGEN SATURATION: 95 % | RESPIRATION RATE: 18 BRPM | SYSTOLIC BLOOD PRESSURE: 101 MMHG | BODY MASS INDEX: 21.66 KG/M2

## 2025-04-27 LAB
ABSOLUTE EOSINOPHIL (OHS): 0.38 K/UL
ABSOLUTE MONOCYTE (OHS): 0.94 K/UL (ref 0.3–1)
ABSOLUTE NEUTROPHIL COUNT (OHS): 3.15 K/UL (ref 1.8–7.7)
ALBUMIN SERPL BCP-MCNC: 3 G/DL (ref 3.5–5.2)
ANION GAP (OHS): 10 MMOL/L (ref 8–16)
ANION GAP (OHS): 11 MMOL/L (ref 8–16)
BACTERIA UR CULT: NORMAL
BASOPHILS # BLD AUTO: 0.08 K/UL
BASOPHILS NFR BLD AUTO: 1.3 %
BUN SERPL-MCNC: 33 MG/DL (ref 6–20)
BUN SERPL-MCNC: 34 MG/DL (ref 6–20)
CALCIUM SERPL-MCNC: 8.5 MG/DL (ref 8.7–10.5)
CALCIUM SERPL-MCNC: 8.5 MG/DL (ref 8.7–10.5)
CHLORIDE SERPL-SCNC: 107 MMOL/L (ref 95–110)
CHLORIDE SERPL-SCNC: 112 MMOL/L (ref 95–110)
CO2 SERPL-SCNC: 25 MMOL/L (ref 23–29)
CO2 SERPL-SCNC: 28 MMOL/L (ref 23–29)
CREAT SERPL-MCNC: 1.4 MG/DL (ref 0.5–1.4)
CREAT SERPL-MCNC: 1.4 MG/DL (ref 0.5–1.4)
ERYTHROCYTE [DISTWIDTH] IN BLOOD BY AUTOMATED COUNT: 16.4 % (ref 11.5–14.5)
GFR SERPLBLD CREATININE-BSD FMLA CKD-EPI: 45 ML/MIN/1.73/M2
GFR SERPLBLD CREATININE-BSD FMLA CKD-EPI: 45 ML/MIN/1.73/M2
GLUCOSE SERPL-MCNC: 120 MG/DL (ref 70–110)
GLUCOSE SERPL-MCNC: 136 MG/DL (ref 70–110)
HCT VFR BLD AUTO: 29 % (ref 37–48.5)
HGB BLD-MCNC: 10.6 GM/DL (ref 12–16)
IMM GRANULOCYTES # BLD AUTO: 0.01 K/UL (ref 0–0.04)
IMM GRANULOCYTES NFR BLD AUTO: 0.2 % (ref 0–0.5)
LYMPHOCYTES # BLD AUTO: 1.65 K/UL (ref 1–4.8)
MAGNESIUM SERPL-MCNC: 2.1 MG/DL (ref 1.6–2.6)
MCH RBC QN AUTO: 28.3 PG (ref 27–31)
MCHC RBC AUTO-ENTMCNC: 36.6 G/DL (ref 32–36)
MCV RBC AUTO: 78 FL (ref 82–98)
NUCLEATED RBC (/100WBC) (OHS): 0 /100 WBC
PHOSPHATE SERPL-MCNC: 2.9 MG/DL (ref 2.7–4.5)
PLATELET # BLD AUTO: 280 K/UL (ref 150–450)
PMV BLD AUTO: 9.3 FL (ref 9.2–12.9)
POTASSIUM SERPL-SCNC: 2.8 MMOL/L (ref 3.5–5.1)
POTASSIUM SERPL-SCNC: 3.3 MMOL/L (ref 3.5–5.1)
RBC # BLD AUTO: 3.74 M/UL (ref 4–5.4)
RELATIVE EOSINOPHIL (OHS): 6.1 %
RELATIVE LYMPHOCYTE (OHS): 26.6 % (ref 18–48)
RELATIVE MONOCYTE (OHS): 15.1 % (ref 4–15)
RELATIVE NEUTROPHIL (OHS): 50.7 % (ref 38–73)
SODIUM SERPL-SCNC: 146 MMOL/L (ref 136–145)
SODIUM SERPL-SCNC: 147 MMOL/L (ref 136–145)
WBC # BLD AUTO: 6.21 K/UL (ref 3.9–12.7)

## 2025-04-27 PROCEDURE — 80048 BASIC METABOLIC PNL TOTAL CA: CPT | Performed by: INTERNAL MEDICINE

## 2025-04-27 PROCEDURE — 83735 ASSAY OF MAGNESIUM: CPT | Performed by: STUDENT IN AN ORGANIZED HEALTH CARE EDUCATION/TRAINING PROGRAM

## 2025-04-27 PROCEDURE — 25000003 PHARM REV CODE 250: Performed by: INTERNAL MEDICINE

## 2025-04-27 PROCEDURE — 36415 COLL VENOUS BLD VENIPUNCTURE: CPT | Performed by: INTERNAL MEDICINE

## 2025-04-27 PROCEDURE — 80069 RENAL FUNCTION PANEL: CPT | Performed by: STUDENT IN AN ORGANIZED HEALTH CARE EDUCATION/TRAINING PROGRAM

## 2025-04-27 PROCEDURE — 85025 COMPLETE CBC W/AUTO DIFF WBC: CPT | Performed by: STUDENT IN AN ORGANIZED HEALTH CARE EDUCATION/TRAINING PROGRAM

## 2025-04-27 PROCEDURE — 36415 COLL VENOUS BLD VENIPUNCTURE: CPT | Performed by: STUDENT IN AN ORGANIZED HEALTH CARE EDUCATION/TRAINING PROGRAM

## 2025-04-27 RX ORDER — POTASSIUM CHLORIDE 20 MEQ/1
20 TABLET, EXTENDED RELEASE ORAL 2 TIMES DAILY
Qty: 60 TABLET | Refills: 2 | Status: SHIPPED | OUTPATIENT
Start: 2025-04-27

## 2025-04-27 RX ORDER — POTASSIUM CHLORIDE 7.45 MG/ML
10 INJECTION INTRAVENOUS
Status: DISCONTINUED | OUTPATIENT
Start: 2025-04-27 | End: 2025-04-27

## 2025-04-27 RX ORDER — POTASSIUM CHLORIDE 7.45 MG/ML
10 INJECTION INTRAVENOUS ONCE
Status: CANCELLED | OUTPATIENT
Start: 2025-04-27 | End: 2025-04-27

## 2025-04-27 RX ADMIN — ACETAMINOPHEN 650 MG: 325 TABLET ORAL at 08:04

## 2025-04-27 RX ADMIN — NATAMYCIN 1 DROP: 50 SUSPENSION/ DROPS OPHTHALMIC at 08:04

## 2025-04-27 RX ADMIN — ACETAMINOPHEN 650 MG: 325 TABLET ORAL at 12:04

## 2025-04-27 RX ADMIN — POTASSIUM BICARBONATE 25 MEQ: 978 TABLET, EFFERVESCENT ORAL at 10:04

## 2025-04-27 RX ADMIN — NATAMYCIN 1 DROP: 50 SUSPENSION/ DROPS OPHTHALMIC at 05:04

## 2025-04-27 NOTE — ASSESSMENT & PLAN NOTE
Baseline creatinine is 0.5. Most recent creatinine and eGFR are listed below.  Recent Labs     04/26/25  0549 04/26/25  1816 04/27/25  0400   CREATININE 1.7* 1.7* 1.4   EGFRNORACEVR 35* 35* 45*      Plan  - JOSE is stable, F/u RFP following IVF's given in ED and K replacement   - Avoid nephrotoxins and renally dose meds for GFR listed above  - Monitor urine output, serial BMP, and adjust therapy as needed.  - Strict intake and output  - F/u USRP  - F/u urine lytes  - F/u UPCR   - Cardiac monitoring   - Nephrology consulted to spin urine; suspect RTA Type 1 from amphotericin vs ATN

## 2025-04-27 NOTE — PLAN OF CARE
Goals met adequate for discharge.     Problem: Adult Inpatient Plan of Care  Goal: Plan of Care Review  Outcome: Met  Goal: Patient-Specific Goal (Individualized)  Outcome: Met  Goal: Absence of Hospital-Acquired Illness or Injury  Outcome: Met  Goal: Optimal Comfort and Wellbeing  Outcome: Met  Goal: Readiness for Transition of Care  Outcome: Met     Problem: Acute Kidney Injury/Impairment  Goal: Fluid and Electrolyte Balance  Outcome: Met  Goal: Improved Oral Intake  Outcome: Met  Goal: Effective Renal Function  Outcome: Met

## 2025-04-27 NOTE — ASSESSMENT & PLAN NOTE
Patient's most recent potassium results are listed below.   Recent Labs     04/26/25  0549 04/26/25  1816 04/27/25  0400   K 2.8* 2.4* 2.8*     Plan  - Replace potassium per protocol  - F/u RFP  - Nephrology consulted given suspicion for RTA Type 1 vs ATN to spin urine

## 2025-04-27 NOTE — PROGRESS NOTES
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 She was seen in PCP clinic on 4/25 for pre-operative clearance for plans to get a prosthetic eye and was told to present to the ER given new JOSE and hypokalemia. Of note, the patient was in her usual state of health up until Saturday, 4/19 when she began to have intermittent nausea with vomiting. This caused poor po intake, but she was trying to keep up with hydration via Gatorade. She was admitted to  from 4/8- 4/10 and it was during that time that she was started on IV micafungin. She notes the dose was recently increased on 4/18 and she continued this higher dose of micafungin through 4/22. She was told on Monday that anti-fungals were no longer controlling the infection and that she would require surgery. She had her PICC removed on 4/24. Of note, she denies any light headedness or LOC. She did not notice any change in her urination, though she has peed quite frequently since arrival to [facility] [place]. She denies any fevers, chills, abdominal pain or discomfort.     Labs at the time of admission as follows: WBC 6K, Na 142, K 2.4, HCO3 20, BUN 54, CR 2.6, AG 13. Admitted to hospital medicine and nephrology consulted.     Overview/Hospital Course:  No notes on file    Interval History: K is 2.8,  CR is improved. -will give  oral K replacement, will discuss dc with nephrology    Review of Systems  Objective:     Vital Signs (Most Recent):  Temp: 97.8 °F (36.6  °C) (04/27/25 0800)  Pulse: 63 (04/27/25 0800)  Resp: 20 (04/27/25 0800)  BP: (!) 105/55 (04/27/25 0800)  SpO2: 98 % (04/27/25 0800) Vital Signs (24h Range):  Temp:  [97.6 °F (36.4 °C)-98.1 °F (36.7 °C)] 97.8 °F (36.6 °C)  Pulse:  [55-87] 63  Resp:  [18-22] 20  SpO2:  [98 %-100 %] 98 %  BP: ()/(50-60) 105/55     Weight: 59 kg (130 lb)  Body mass index is 21.63 kg/m².  No intake or output data in the 24 hours ending 04/27/25 0937   Physical Exam  Vitals reviewed.   Constitutional:       General: She is not in acute distress.     Appearance: She is not ill-appearing.   HENT:      Nose: Nose normal.      Mouth/Throat:      Mouth: Mucous membranes are moist.   Eyes:      General: No scleral icterus.     Comments: Left eye opacified    Cardiovascular:      Rate and Rhythm: Normal rate.      Pulses: Normal pulses.   Pulmonary:      Effort: Pulmonary effort is normal. No respiratory distress.   Abdominal:      General: Abdomen is flat. There is no distension.      Palpations: Abdomen is soft.      Tenderness: There is no abdominal tenderness. There is no guarding.   Musculoskeletal:         General: No swelling.   Skin:     General: Skin is warm and dry.      Coloration: Skin is not jaundiced.   Neurological:      Mental Status: She is alert and oriented to person, place, and time. Mental status is at baseline.   Psychiatric:         Mood and Affect: Mood normal.         Behavior: Behavior normal.         Computed MELD 3.0 unavailable. One or more values for this score either were not found within the given timeframe or did not fit some other criterion.  Computed MELD-Na unavailable. One or more values for this score either were not found within the given timeframe or did not fit some other criterion.      Significant Labs:  CBC:  Recent Labs   Lab 04/25/25  1713 04/26/25  0549 04/27/25  0400   WBC 6.53 5.85 6.21   HGB 11.7* 10.7* 10.6*   HCT 31.4* 28.9* 29.0*    291 280     CMP:  Recent Labs   Lab  "04/25/25  1044 04/25/25  1713 04/25/25  2221 04/26/25  0549 04/26/25  1816 04/27/25  0400    142 144 143 146* 147*   K 2.7* 2.4* 2.7* 2.8* 2.4* 2.8*   * 109 116* 116* 113* 112*   CO2 19* 20* 18* 18* 22* 25   BUN 53* 54* 45* 37* 36* 33*   CREATININE 2.6* 2.6* 2.1* 1.7* 1.7* 1.4   CALCIUM 8.9 8.9 8.0* 8.2* 8.5* 8.5*   ALBUMIN 3.4* 3.5 2.8* 3.0*  --  3.0*   BILITOT 0.2 0.2  --   --   --   --    ALKPHOS 117 119  --   --   --   --    AST 17 14  --   --   --   --    ALT 14 14  --   --   --   --    ANIONGAP 13 13 10 9 11 10     PTINR:  No results for input(s): "INR" in the last 48 hours.    Significant Procedures:   Dobutamine Stress Test with Color Flow: No results found for this or any previous visit.    None      Assessment & Plan  JOSE (acute kidney injury)   Baseline creatinine is  0.5 . Most recent creatinine and eGFR are listed below.  Recent Labs     04/26/25  0549 04/26/25 1816 04/27/25  0400   CREATININE 1.7* 1.7* 1.4   EGFRNORACEVR 35* 35* 45*      Plan  - JOSE is stable, F/u RFP following IVF's given in ED and K replacement   - Avoid nephrotoxins and renally dose meds for GFR listed above  - Monitor urine output, serial BMP, and adjust therapy as needed.  - Strict intake and output  - F/u USRP  - F/u urine lytes  - F/u UPCR   - Cardiac monitoring   - Nephrology consulted to spin urine; suspect RTA Type 1 from amphotericin vs ATN   Fungal endophthalmitis  - Resume home eye drops   - Not currently on systemic therapy   - No indication to consult ophthalmology at this time, can consider if needed   Hypokalemia  Patient's most recent potassium results are listed below.   Recent Labs     04/26/25  0549 04/26/25 1816 04/27/25  0400   K 2.8* 2.4* 2.8*     Plan  - Replace potassium per protocol  - F/u RFP  - Nephrology consulted given suspicion for RTA Type 1 vs ATN to spin urine   VTE Risk Mitigation (From admission, onward)           Ordered     IP VTE LOW RISK PATIENT  Once         04/25/25 5506     " Place sequential compression device  Until discontinued         04/25/25 2154                    Discharge Planning   DO: 4/29/2025     Code Status: Full Code   Medical Readiness for Discharge Date:   Discharge Plan A: Home with family                        Herman Patton MD  Department of Hospital Medicine   [facility] - Acute Medical Stepdown

## 2025-04-27 NOTE — PLAN OF CARE
[facility] - Acute Medical Stepdown  Discharge Final Note    Primary Care Provider: Jacqueline Serna MD    Expected Discharge Date: 4/27/2025    Final Discharge Note (most recent)       Final Note - 04/27/25 1504          Final Note    Assessment Type Final Discharge Note     Anticipated Discharge Disposition Home or Self Care     Hospital Resources/Appts/Education Provided Appointments scheduled and added to AVS        Post-Acute Status    Post-Acute Authorization Other     Other Status No Post-Acute Service Needs     Discharge Delays None known at this time                     Important Message from Medicare

## 2025-04-27 NOTE — NURSING
Uneventful night, VSS soft b/ps, see flowsheet, tele NSR, Q2hr left eye drops in progress, K+ 2.4 to 2.8 after two PO 50meq K+ bicarbonate tabs, skin warm dry resp even unlabored, no distress noted. Safety measures remain in place, care plan on going.

## 2025-04-27 NOTE — SUBJECTIVE & OBJECTIVE
Interval History: K is 2.8,  CR is improved. -will give  oral K replacement, will discuss dc with nephrology    Review of Systems  Objective:     Vital Signs (Most Recent):  Temp: 97.8 °F (36.6 °C) (04/27/25 0800)  Pulse: 63 (04/27/25 0800)  Resp: 20 (04/27/25 0800)  BP: (!) 105/55 (04/27/25 0800)  SpO2: 98 % (04/27/25 0800) Vital Signs (24h Range):  Temp:  [97.6 °F (36.4 °C)-98.1 °F (36.7 °C)] 97.8 °F (36.6 °C)  Pulse:  [55-87] 63  Resp:  [18-22] 20  SpO2:  [98 %-100 %] 98 %  BP: ()/(50-60) 105/55     Weight: 59 kg (130 lb)  Body mass index is 21.63 kg/m².  No intake or output data in the 24 hours ending 04/27/25 0937   Physical Exam  Vitals reviewed.   Constitutional:       General: She is not in acute distress.     Appearance: She is not ill-appearing.   HENT:      Nose: Nose normal.      Mouth/Throat:      Mouth: Mucous membranes are moist.   Eyes:      General: No scleral icterus.     Comments: Left eye opacified    Cardiovascular:      Rate and Rhythm: Normal rate.      Pulses: Normal pulses.   Pulmonary:      Effort: Pulmonary effort is normal. No respiratory distress.   Abdominal:      General: Abdomen is flat. There is no distension.      Palpations: Abdomen is soft.      Tenderness: There is no abdominal tenderness. There is no guarding.   Musculoskeletal:         General: No swelling.   Skin:     General: Skin is warm and dry.      Coloration: Skin is not jaundiced.   Neurological:      Mental Status: She is alert and oriented to person, place, and time. Mental status is at baseline.   Psychiatric:         Mood and Affect: Mood normal.         Behavior: Behavior normal.         Computed MELD 3.0 unavailable. One or more values for this score either were not found within the given timeframe or did not fit some other criterion.  Computed MELD-Na unavailable. One or more values for this score either were not found within the given timeframe or did not fit some other criterion.      Significant  "Labs:  CBC:  Recent Labs   Lab 04/25/25  1713 04/26/25  0549 04/27/25  0400   WBC 6.53 5.85 6.21   HGB 11.7* 10.7* 10.6*   HCT 31.4* 28.9* 29.0*    291 280     CMP:  Recent Labs   Lab 04/25/25  1044 04/25/25  1713 04/25/25  2221 04/26/25  0549 04/26/25  1816 04/27/25  0400    142 144 143 146* 147*   K 2.7* 2.4* 2.7* 2.8* 2.4* 2.8*   * 109 116* 116* 113* 112*   CO2 19* 20* 18* 18* 22* 25   BUN 53* 54* 45* 37* 36* 33*   CREATININE 2.6* 2.6* 2.1* 1.7* 1.7* 1.4   CALCIUM 8.9 8.9 8.0* 8.2* 8.5* 8.5*   ALBUMIN 3.4* 3.5 2.8* 3.0*  --  3.0*   BILITOT 0.2 0.2  --   --   --   --    ALKPHOS 117 119  --   --   --   --    AST 17 14  --   --   --   --    ALT 14 14  --   --   --   --    ANIONGAP 13 13 10 9 11 10     PTINR:  No results for input(s): "INR" in the last 48 hours.    Significant Procedures:   Dobutamine Stress Test with Color Flow: No results found for this or any previous visit.    None  "

## 2025-04-27 NOTE — NURSING
Patient discharge ordered. Iv removed. Tele dc. Discharge paperwork provided and reviewed. Medication picked up from pharmacy. Patient discharging home via spouse.

## 2025-04-28 ENCOUNTER — TELEPHONE (OUTPATIENT)
Dept: PRIMARY CARE CLINIC | Facility: CLINIC | Age: 55
End: 2025-04-28
Payer: COMMERCIAL

## 2025-04-28 NOTE — TELEPHONE ENCOUNTER
She is eating and drinking better since Sat. She is no longer having emesis.  Her Potassium and renal function improved on discharge.  She is sched for a repeat BMP tomorrow. If ok clear for surgery. She is on po KCL BID.                                                                     Dr. SAVAGE

## 2025-04-29 ENCOUNTER — LAB VISIT (OUTPATIENT)
Dept: LAB | Facility: HOSPITAL | Age: 55
End: 2025-04-29
Attending: INTERNAL MEDICINE
Payer: COMMERCIAL

## 2025-04-29 DIAGNOSIS — H44.19 FUNGAL ENDOPHTHALMITIS: ICD-10-CM

## 2025-04-29 DIAGNOSIS — B49 FUNGAL ENDOPHTHALMITIS: ICD-10-CM

## 2025-04-29 LAB
ANION GAP (OHS): 7 MMOL/L (ref 8–16)
BUN SERPL-MCNC: 36 MG/DL (ref 6–20)
CALCIUM SERPL-MCNC: 8.5 MG/DL (ref 8.7–10.5)
CHLORIDE SERPL-SCNC: 109 MMOL/L (ref 95–110)
CO2 SERPL-SCNC: 28 MMOL/L (ref 23–29)
CREAT SERPL-MCNC: 1.2 MG/DL (ref 0.5–1.4)
GFR SERPLBLD CREATININE-BSD FMLA CKD-EPI: 54 ML/MIN/1.73/M2
GLUCOSE SERPL-MCNC: 154 MG/DL (ref 70–110)
POTASSIUM SERPL-SCNC: 3.3 MMOL/L (ref 3.5–5.1)
SODIUM SERPL-SCNC: 144 MMOL/L (ref 136–145)

## 2025-04-29 PROCEDURE — 36415 COLL VENOUS BLD VENIPUNCTURE: CPT

## 2025-04-29 PROCEDURE — 82310 ASSAY OF CALCIUM: CPT

## 2025-04-29 NOTE — TELEPHONE ENCOUNTER
----- Message from Tech Nehemias sent at 4/29/2025 11:31 AM CDT -----  Contact: [phone]  2TESTRESULTSType: Test ResultsWhat test was performed? labsWho ordered the test? Chirag and where were the test performed?  [facility] 4/29Would you like a call back and or thru [facility]: CALLComments: pt needs them read and faxed over for surgery tomorrow

## 2025-05-06 ENCOUNTER — PATIENT MESSAGE (OUTPATIENT)
Dept: PRIMARY CARE CLINIC | Facility: CLINIC | Age: 55
End: 2025-05-06
Payer: COMMERCIAL

## 2025-05-06 DIAGNOSIS — E87.6 HYPOKALEMIA: Primary | ICD-10-CM

## 2025-05-07 ENCOUNTER — LAB VISIT (OUTPATIENT)
Dept: LAB | Facility: HOSPITAL | Age: 55
End: 2025-05-07
Payer: COMMERCIAL

## 2025-05-07 ENCOUNTER — RESULTS FOLLOW-UP (OUTPATIENT)
Dept: PRIMARY CARE CLINIC | Facility: CLINIC | Age: 55
End: 2025-05-07

## 2025-05-07 DIAGNOSIS — E87.6 HYPOKALEMIA: ICD-10-CM

## 2025-05-07 DIAGNOSIS — E87.6 HYPOKALEMIA: Primary | ICD-10-CM

## 2025-05-07 LAB
ANION GAP (OHS): 7 MMOL/L (ref 8–16)
BUN SERPL-MCNC: 21 MG/DL (ref 6–20)
CALCIUM SERPL-MCNC: 8.9 MG/DL (ref 8.7–10.5)
CHLORIDE SERPL-SCNC: 110 MMOL/L (ref 95–110)
CO2 SERPL-SCNC: 24 MMOL/L (ref 23–29)
CREAT SERPL-MCNC: 0.7 MG/DL (ref 0.5–1.4)
GFR SERPLBLD CREATININE-BSD FMLA CKD-EPI: >60 ML/MIN/1.73/M2
GLUCOSE SERPL-MCNC: 101 MG/DL (ref 70–110)
POTASSIUM SERPL-SCNC: 4.5 MMOL/L (ref 3.5–5.1)
SODIUM SERPL-SCNC: 141 MMOL/L (ref 136–145)

## 2025-05-07 PROCEDURE — 36415 COLL VENOUS BLD VENIPUNCTURE: CPT

## 2025-05-07 PROCEDURE — 80048 BASIC METABOLIC PNL TOTAL CA: CPT

## 2025-05-07 NOTE — TELEPHONE ENCOUNTER
LOV with Jacqueline Serna MD , 4/25/2025  Pt requesting lab orders to recheck potassium levels. Last bmp was on 4/29/25 and potassium was 3.3. I pended a BMP for your review

## 2025-05-19 ENCOUNTER — LAB VISIT (OUTPATIENT)
Dept: LAB | Facility: HOSPITAL | Age: 55
End: 2025-05-19
Attending: INTERNAL MEDICINE
Payer: COMMERCIAL

## 2025-05-19 DIAGNOSIS — E87.6 HYPOKALEMIA: ICD-10-CM

## 2025-05-19 LAB
ANION GAP (OHS): 6 MMOL/L (ref 8–16)
BUN SERPL-MCNC: 18 MG/DL (ref 6–20)
CALCIUM SERPL-MCNC: 8.7 MG/DL (ref 8.7–10.5)
CHLORIDE SERPL-SCNC: 111 MMOL/L (ref 95–110)
CO2 SERPL-SCNC: 23 MMOL/L (ref 23–29)
CREAT SERPL-MCNC: 0.7 MG/DL (ref 0.5–1.4)
GFR SERPLBLD CREATININE-BSD FMLA CKD-EPI: >60 ML/MIN/1.73/M2
GLUCOSE SERPL-MCNC: 90 MG/DL (ref 70–110)
POTASSIUM SERPL-SCNC: 4.1 MMOL/L (ref 3.5–5.1)
SODIUM SERPL-SCNC: 140 MMOL/L (ref 136–145)

## 2025-05-19 PROCEDURE — 82310 ASSAY OF CALCIUM: CPT

## 2025-05-19 PROCEDURE — 36415 COLL VENOUS BLD VENIPUNCTURE: CPT

## 2025-05-20 ENCOUNTER — RESULTS FOLLOW-UP (OUTPATIENT)
Dept: PRIMARY CARE CLINIC | Facility: CLINIC | Age: 55
End: 2025-05-20

## 2025-05-20 NOTE — PROGRESS NOTES
I sent pt a my chart message -  I reviewed your labs.  Your kidney function looked good.  Continue to hydrate well.  Your potassium was normal.  Dr. SAVAGE

## 2025-08-03 ENCOUNTER — OFFICE VISIT (OUTPATIENT)
Dept: URGENT CARE | Facility: CLINIC | Age: 55
End: 2025-08-03
Payer: COMMERCIAL

## 2025-08-03 VITALS
SYSTOLIC BLOOD PRESSURE: 96 MMHG | BODY MASS INDEX: 21.66 KG/M2 | HEART RATE: 106 BPM | OXYGEN SATURATION: 98 % | RESPIRATION RATE: 18 BRPM | TEMPERATURE: 98 F | HEIGHT: 65 IN | DIASTOLIC BLOOD PRESSURE: 61 MMHG | WEIGHT: 130 LBS
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
## 2025-08-03 PROCEDURE — 99214 OFFICE O/P EST MOD 30 MIN: CPT | Mod: S$GLB,,, | Performed by: FAMILY MEDICINE

## 2025-08-03 RX ORDER — SULFAMETHOXAZOLE AND TRIMETHOPRIM 800; 160 MG/1; MG/1
1 TABLET ORAL 2 TIMES DAILY
Qty: 20 TABLET | Refills: 0 | Status: SHIPPED | OUTPATIENT
Start: 2025-08-03

## 2025-08-03 RX ORDER — MUPIROCIN 20 MG/G
OINTMENT TOPICAL
Qty: 22 G | Refills: 1 | Status: SHIPPED | OUTPATIENT
Start: 2025-08-03

## 2025-08-03 NOTE — PROGRESS NOTES
"Subjective:      Patient ID: Carie Howell is a 55 y.o. female.    Vitals:  height is 5' 5" (1.651 m) and weight is 59 kg (130 lb). Her oral temperature is 98.1 °F (36.7 °C). Her blood pressure is 96/61 and her pulse is 106. Her respiration is 18 and oxygen saturation is 98%.     Chief Complaint: Insect Bite    55 yr old female presents with a bug bite to her left arm. Her symptoms started a week ago. The bite is painful to touch.    Insect Bite  This is a new problem. The current episode started in the past 7 days. The problem occurs constantly. The problem has been gradually worsening. Pertinent negatives include no abdominal pain, anorexia, arthralgias, change in bowel habit, chest pain, chills, congestion, coughing, diaphoresis, fatigue, fever, headaches, joint swelling, myalgias, nausea, neck pain, numbness, rash, sore throat, swollen glands, urinary symptoms, vertigo, visual change, vomiting or weakness. Nothing aggravates the symptoms. She has tried nothing (anti itch spray) for the symptoms.     Constitution: Negative for chills, sweating, fatigue and fever.   HENT:  Negative for congestion and sore throat.    Neck: Negative for neck pain.   Cardiovascular:  Negative for chest pain.   Respiratory:  Negative for cough.    Gastrointestinal:  Negative for abdominal pain, nausea and vomiting.   Musculoskeletal:  Negative for joint pain, joint swelling and muscle ache.   Skin:  Positive for erythema. Negative for rash.   Neurological:  Negative for history of vertigo, headaches and numbness.      Objective:     Physical Exam   Constitutional: She does not appear ill. No distress. normal  Cardiovascular: Normal rate, regular rhythm, normal heart sounds and normal pulses.   Pulmonary/Chest: Effort normal and breath sounds normal.   Abdominal: Normal appearance.   Neurological: She is alert.   Skin: Skin is warm. erythema and lesion (circular quarter-sized raised area of erythema left forearm with central " puncture wound and ipsilateral epitrochlear tender adenopathy noted)   Nursing note and vitals reviewed.      Assessment:     1. Bug bite with infection, initial encounter        Plan:       Bug bite with infection, initial encounter  -     sulfamethoxazole-trimethoprim 800-160mg (BACTRIM DS) 800-160 mg Tab; Take 1 tablet by mouth 2 (two) times daily.  Dispense: 20 tablet; Refill: 0  -     mupirocin (BACTROBAN) 2 % ointment; Apply to affected area 3 times daily  Dispense: 22 g; Refill: 1    Warm compresses. To monitor for signs of worsening infection. RTC as needed